# Patient Record
Sex: MALE | Race: OTHER | URBAN - METROPOLITAN AREA
[De-identification: names, ages, dates, MRNs, and addresses within clinical notes are randomized per-mention and may not be internally consistent; named-entity substitution may affect disease eponyms.]

---

## 2019-02-27 ENCOUNTER — OFFICE VISIT (OUTPATIENT)
Dept: FAMILY MEDICINE CLINIC | Age: 64
End: 2019-02-27

## 2019-02-27 VITALS
BODY MASS INDEX: 35.65 KG/M2 | RESPIRATION RATE: 20 BRPM | SYSTOLIC BLOOD PRESSURE: 115 MMHG | TEMPERATURE: 98.2 F | WEIGHT: 214 LBS | HEIGHT: 65 IN | OXYGEN SATURATION: 95 % | HEART RATE: 62 BPM | DIASTOLIC BLOOD PRESSURE: 80 MMHG

## 2019-02-27 DIAGNOSIS — I25.5 ISCHEMIC CARDIOMYOPATHY: ICD-10-CM

## 2019-02-27 DIAGNOSIS — Z12.11 SCREEN FOR COLON CANCER: ICD-10-CM

## 2019-02-27 DIAGNOSIS — E78.00 PURE HYPERCHOLESTEROLEMIA: ICD-10-CM

## 2019-02-27 DIAGNOSIS — I25.10 CORONARY ARTERY DISEASE INVOLVING NATIVE CORONARY ARTERY OF NATIVE HEART WITHOUT ANGINA PECTORIS: Primary | ICD-10-CM

## 2019-02-27 DIAGNOSIS — I10 ESSENTIAL HYPERTENSION: ICD-10-CM

## 2019-02-27 RX ORDER — FUROSEMIDE 40 MG/1
40 TABLET ORAL DAILY
Qty: 90 TAB | Refills: 1 | Status: SHIPPED | OUTPATIENT
Start: 2019-02-27 | End: 2020-01-02 | Stop reason: SDUPTHER

## 2019-02-27 RX ORDER — ATORVASTATIN CALCIUM 80 MG/1
80 TABLET, FILM COATED ORAL DAILY
Qty: 90 TAB | Refills: 1 | Status: SHIPPED | OUTPATIENT
Start: 2019-02-27 | End: 2020-01-02 | Stop reason: SDUPTHER

## 2019-02-27 RX ORDER — SPIRONOLACTONE 25 MG/1
25 TABLET ORAL DAILY
Qty: 90 TAB | Refills: 1 | Status: SHIPPED | OUTPATIENT
Start: 2019-02-27 | End: 2020-01-02 | Stop reason: SDUPTHER

## 2019-02-27 RX ORDER — CARVEDILOL 6.25 MG/1
6.25 TABLET ORAL 2 TIMES DAILY WITH MEALS
Qty: 180 TAB | Refills: 1 | Status: SHIPPED | OUTPATIENT
Start: 2019-02-27 | End: 2020-01-02 | Stop reason: SDUPTHER

## 2019-02-27 RX ORDER — SPIRONOLACTONE 25 MG/1
TABLET ORAL DAILY
COMMUNITY
End: 2019-02-27 | Stop reason: SDUPTHER

## 2019-02-27 RX ORDER — LISINOPRIL 10 MG/1
TABLET ORAL DAILY
COMMUNITY
End: 2019-02-27 | Stop reason: SDUPTHER

## 2019-02-27 RX ORDER — CARVEDILOL 6.25 MG/1
TABLET ORAL 2 TIMES DAILY WITH MEALS
COMMUNITY
End: 2019-02-27 | Stop reason: SDUPTHER

## 2019-02-27 RX ORDER — ATORVASTATIN CALCIUM 80 MG/1
80 TABLET, FILM COATED ORAL DAILY
COMMUNITY
End: 2019-02-27 | Stop reason: SDUPTHER

## 2019-02-27 RX ORDER — FUROSEMIDE 40 MG/1
TABLET ORAL DAILY
COMMUNITY
End: 2019-02-27 | Stop reason: SDUPTHER

## 2019-02-27 RX ORDER — LISINOPRIL 10 MG/1
10 TABLET ORAL DAILY
Qty: 90 TAB | Refills: 1 | Status: SHIPPED | OUTPATIENT
Start: 2019-02-27 | End: 2020-01-02 | Stop reason: SDUPTHER

## 2019-02-27 RX ORDER — ASPIRIN 81 MG/1
TABLET ORAL DAILY
COMMUNITY
End: 2020-01-02 | Stop reason: SDUPTHER

## 2019-02-27 NOTE — PROGRESS NOTES
Assessment/Plan:   
Diagnoses and all orders for this visit: 1. Coronary artery disease involving native coronary artery of native heart without angina pectoris- on statin, ASA, ACEI, BB. Followed by dr. Miguelito Summers. -     atorvastatin (LIPITOR) 80 mg tablet; Take 1 Tab by mouth daily. 2. Ischemic cardiomyopathy- EF stable at 35%. Being eval for AICD 
-     furosemide (LASIX) 40 mg tablet; Take 1 Tab by mouth daily. 3. Essential hypertension- controlled. Cont current. -     lisinopril (PRINIVIL, ZESTRIL) 10 mg tablet; Take 1 Tab by mouth daily. -     carvedilol (COREG) 6.25 mg tablet; Take 1 Tab by mouth two (2) times daily (with meals). -     spironolactone (ALDACTONE) 25 mg tablet; Take 1 Tab by mouth daily. 4. Pure hypercholesterolemia 
-     atorvastatin (LIPITOR) 80 mg tablet; Take 1 Tab by mouth daily. The plan was discussed with the patient. The patient verbalized understanding and is in agreement with the plan. All medication potential side effects were discussed with the patient. Health Maintenance: colo 8 yrs ago, [facility]. - get records. Also get vaccine records. Health Maintenance Topic Date Due  
 Hepatitis C Screening  1955  DTaP/Tdap/Td series (1 - Tdap) 10/08/1975  Shingrix Vaccine Age 50> (1 of 2) 10/08/2004  FOBT Q 1 YEAR AGE 50-75  10/08/2004  Influenza Age 5 to Adult  08/01/2018 Josué Silver is a 58 y.o.  male and presents with Establish Care Subjective: 
Pt is here to establish care. HTn -bp good. States he recently had labs and will  med records from [facility]. 
 
CAD - s/p stents 2015. On ASA 81mg, statin, ACEI, BB. Has known ischemic systolic CMO (ZS80%). He's being eval for AICD. Followed by Cards, Dr. Spring Burns. ROS: 
Constitutional: No recent weight change. No weakness/fatigue. No f/c. Skin: No rashes, change in nails/hair, itching HENT: No HA, dizziness. No hearing loss/tinnitus.   No nasal congestion/discharge. Eyes: No change in vision, double/blurred vision or eye pain/redness. Cardiovascular: No CP/palpitations. No TERAN/orthopnea/PND. Respiratory: No cough/sputum, dyspnea, wheezing. Gastointestinal: No dysphagia, reflux. No n/v. No constipation/diarrhea. No melena/rectal bleeding. Genitourinary: No dysuria, urinary hesitancy, nocturia, hematuria. No incontinence. Musculoskeletal: No joint pain/stiffness. No muscle pain/tenderness. Endo: No heat/cold intolerance, no polyuria/polydypsia. Heme: No h/o anemia. No easy bleeding/bruising. Allergy/Immunology: No seasonal rhinitis. Denies frequent colds, sinus/ear infections. Neurological: No seizures/numbness/weakness. No paresthesias. Psychiatric:  No depression, anxiety. PMH: 
Past Medical History:  
Diagnosis Date  Atherosclerotic heart disease of native coronary artery with angina pectoris ([address]  Cataract  Hypercholesterolemia  Hypertension  Ischemic cardiomyopathy PSH: 
Past Surgical History:  
Procedure Laterality Date  HX CORNEAL TRANSPLANT Right 2016  HX CORNEAL TRANSPLANT Left 2018  HX CORONARY STENT PLACEMENT  03/2015  HX HIP REPLACEMENT Bilateral 2011 SH: Social History Tobacco Use  Smoking status: Former Smoker  Smokeless tobacco: Never Used Substance Use Topics  Alcohol use: Yes Frequency: 2-3 times a week Drinks per session: 1 or 2 Binge frequency: Never  Drug use: No  
 
 
FH: 
Family History Problem Relation Age of Onset  Heart Disease Father Medications/Allergies: 
 
Current Outpatient Medications:  
  lisinopril (PRINIVIL, ZESTRIL) 10 mg tablet, Take  by mouth daily. , Disp: , Rfl:  
  carvedilol (COREG) 6.25 mg tablet, Take  by mouth two (2) times daily (with meals). , Disp: , Rfl:  
  spironolactone (ALDACTONE) 25 mg tablet, Take  by mouth daily. , Disp: , Rfl:  
   atorvastatin (LIPITOR) 80 mg tablet, Take 80 mg by mouth daily. , Disp: , Rfl:  
  furosemide (LASIX) 40 mg tablet, Take  by mouth daily. , Disp: , Rfl:  
  aspirin delayed-release 81 mg tablet, Take  by mouth daily. , Disp: , Rfl:  
No Known Allergies Objective: 
Visit Vitals /80 (BP 1 Location: Right arm, BP Patient Position: Sitting) Pulse 62 Temp 98.2 °F (36.8 °C) (Oral) Resp 20 Ht 5' 5\" (1.651 m) Wt 214 lb (97.1 kg) SpO2 95% BMI 35.61 kg/m² Constitutional: Well developed, nourished, no distress, alert, obese habitus HENT: Exterior ears and tympanic membranes normal bilaterally. Supple neck. No thyromegaly or lymphadenopathy. Oropharynx clear and moist mucous membranes. Eyes: Conjunctiva normal. PERRL. Cardiovascular: S1, S2.  RRR. No murmurs/rubs. No thrills palpated. No carotid bruits. Intact distal pulses. No edema. Pulmonary/Chest Wall: No abnormalities on inspection. Clear to auscultation bilaterally. No wheezing/rhonchi. Normal effort. GI: Soft, nontender, nondistended. Normal active bowel sounds. No  masses on palpation. No hepatosplenomegaly. Musculoskeletal: Gait normal.  Joints without deformity/tenderness. Neurological: Appropriate. No focal motor or sensory deficits. Speech normal.  
Skin: No lesions/rashes on inspection. Psych: Appropriate affect, judgement and insight. Short-term memory intact.

## 2019-02-27 NOTE — PROGRESS NOTES
Yuyd Sahu is a 58 y.o. male (: 1955) presenting to address: Chief Complaint Patient presents with Novs[place]umar.Precise Establish Care Vitals:  
 19 1028 BP: 115/80 Pulse: 62 Resp: 20 Temp: 98.2 °F (36.8 °C) TempSrc: Oral  
SpO2: 95% Weight: 214 lb (97.1 kg) Height: 5' 5\" (1.651 m) PainSc:   0 - No pain Hearing/Vision:  
 
 Visual Acuity Screening Right eye Left eye Both eyes Without correction: 20/40 20/00 20/30 With correction:     
 
 
Learning Assessment:  
 
Learning Assessment 2019 PRIMARY LEARNER Patient HIGHEST LEVEL OF EDUCATION - PRIMARY LEARNER  2 YEARS OF COLLEGE  
BARRIERS PRIMARY LEARNER NONE  
CO-LEARNER CAREGIVER No  
PRIMARY LANGUAGE ENGLISH  NEED No  
LEARNER PREFERENCE PRIMARY READING  
LEARNING SPECIAL TOPICS none ANSWERED BY patient RELATIONSHIP SELF  
ASSESSMENT COMMENT n/a Depression Screening:  
 
3 most recent PHQ Screens 2019 Little interest or pleasure in doing things Not at all Feeling down, depressed, irritable, or hopeless Not at all Total Score PHQ 2 0 Fall Risk Assessment:  
 
Fall Risk Assessment, last 12 mths 2019 Able to walk? Yes Fall in past 12 months? No  
 
Abuse Screening:  
 
Abuse Screening Questionnaire 2019 Do you ever feel afraid of your partner? Jory Lower Are you in a relationship with someone who physically or mentally threatens you? Jory Lower Is it safe for you to go home? Belen Navarro Advanced Directive: 1. Do you have an Advanced Directive? NO 
 
2. Would you like information on Advanced Directives?  YES

## 2019-07-01 ENCOUNTER — HOSPITAL ENCOUNTER (OUTPATIENT)
Dept: LAB | Age: 64
Discharge: HOME | End: 2019-07-01
Payer: MEDICARE

## 2019-07-01 ENCOUNTER — OFFICE VISIT (OUTPATIENT)
Dept: FAMILY MEDICINE CLINIC | Age: 64
End: 2019-07-01

## 2019-07-01 VITALS
HEIGHT: 65 IN | HEART RATE: 62 BPM | BODY MASS INDEX: 36.06 KG/M2 | SYSTOLIC BLOOD PRESSURE: 100 MMHG | OXYGEN SATURATION: 97 % | DIASTOLIC BLOOD PRESSURE: 72 MMHG | WEIGHT: 216.4 LBS | TEMPERATURE: 98.2 F | RESPIRATION RATE: 18 BRPM

## 2019-07-01 DIAGNOSIS — G47.62 NOCTURNAL LEG CRAMPS: Primary | ICD-10-CM

## 2019-07-01 DIAGNOSIS — Z11.59 SPECIAL SCREENING EXAMINATION FOR VIRAL DISEASE: ICD-10-CM

## 2019-07-01 DIAGNOSIS — G47.62 NOCTURNAL LEG CRAMPS: ICD-10-CM

## 2019-07-01 DIAGNOSIS — I25.10 CORONARY ARTERY DISEASE INVOLVING NATIVE CORONARY ARTERY OF NATIVE HEART WITHOUT ANGINA PECTORIS: ICD-10-CM

## 2019-07-01 LAB
ANION GAP SERPL CALC-SCNC: 9 MMOL/L (ref 3–18)
BUN SERPL-MCNC: 20 MG/DL (ref 7–18)
BUN/CREAT SERPL: 15 (ref 12–20)
CALCIUM SERPL-MCNC: 8.6 MG/DL (ref 8.5–10.1)
CHLORIDE SERPL-SCNC: 102 MMOL/L (ref 100–108)
CHOLEST SERPL-MCNC: 134 MG/DL
CO2 SERPL-SCNC: 27 MMOL/L (ref 21–32)
CREAT SERPL-MCNC: 1.36 MG/DL (ref 0.6–1.3)
ERYTHROCYTE [DISTWIDTH] IN BLOOD BY AUTOMATED COUNT: 13.2 % (ref 11.6–14.5)
GLUCOSE SERPL-MCNC: 92 MG/DL (ref 74–99)
HCT VFR BLD AUTO: 44.1 % (ref 36–48)
HDLC SERPL-MCNC: 44 MG/DL (ref 40–60)
HDLC SERPL: 3 {RATIO} (ref 0–5)
HGB BLD-MCNC: 15 G/DL (ref 13–16)
LDLC SERPL CALC-MCNC: 59.4 MG/DL (ref 0–100)
LIPID PROFILE,FLP: ABNORMAL
MAGNESIUM SERPL-MCNC: 2.6 MG/DL (ref 1.6–2.6)
MCH RBC QN AUTO: 31.6 PG (ref 24–34)
MCHC RBC AUTO-ENTMCNC: 34 G/DL (ref 31–37)
MCV RBC AUTO: 92.8 FL (ref 74–97)
PLATELET # BLD AUTO: 288 K/UL (ref 135–420)
PMV BLD AUTO: 9.5 FL (ref 9.2–11.8)
POTASSIUM SERPL-SCNC: 4.2 MMOL/L (ref 3.5–5.5)
RBC # BLD AUTO: 4.75 M/UL (ref 4.7–5.5)
SODIUM SERPL-SCNC: 138 MMOL/L (ref 136–145)
TRIGL SERPL-MCNC: 153 MG/DL (ref ?–150)
VLDLC SERPL CALC-MCNC: 30.6 MG/DL
WBC # BLD AUTO: 6.5 K/UL (ref 4.6–13.2)

## 2019-07-01 PROCEDURE — 83735 ASSAY OF MAGNESIUM: CPT

## 2019-07-01 PROCEDURE — 80061 LIPID PANEL: CPT

## 2019-07-01 PROCEDURE — 36415 COLL VENOUS BLD VENIPUNCTURE: CPT

## 2019-07-01 PROCEDURE — 86803 HEPATITIS C AB TEST: CPT

## 2019-07-01 PROCEDURE — 80048 BASIC METABOLIC PNL TOTAL CA: CPT

## 2019-07-01 PROCEDURE — 85027 COMPLETE CBC AUTOMATED: CPT

## 2019-07-01 NOTE — PROGRESS NOTES
Assessment/Plan:    1. Nocturnal leg cramps  -ck potassium/mag and bun. Bun has been elevated in past.  Will advise based on labs. - METABOLIC PANEL, BASIC; Future  - MAGNESIUM; Future    2. Special screening examination for viral disease  - HCV AB W/RFLX TO SKYLER; Future    3. Coronary artery disease involving native coronary artery of native heart without angina pectoris  -on statin. Ck labs. - LIPID PANEL; Future  - CBC W/O DIFF; Future      The plan was discussed with the patient. The patient verbalized understanding and is in agreement with the plan. All medication potential side effects were discussed with the patient. Health Maintenance:   Health Maintenance   Topic Date Due    Hepatitis C Screening  1955    Shingrix Vaccine Age 50> (1 of 2) 10/08/2004    MEDICARE YEARLY EXAM  02/27/2019    Influenza Age 5 to Adult  08/01/2019    DTaP/Tdap/Td series (2 - Td) 01/24/2022    COLONOSCOPY  05/09/2029    Pneumococcal 0-64 years  Aged Out       Patience Cristopher is a 58 y.o. male and presents with Leg Pain (At night)     Subjective:  Pt with h/o CAD c/o nocturnal leg cramps in BLE. He's on spironolactone and lasix. She states he drinks almost a gallon of water/day. ROS:  Constitutional: No recent weight change. No weakness/fatigue. No f/c. Eyes: No change in vision, double/blurred vision or eye pain/redness. Cardiovascular: No CP/palpitations. No TERAN/orthopnea/PND. Respiratory: No cough/sputum, dyspnea, wheezing. Musculoskeletal: No joint pain/stiffness. No muscle pain/tenderness. The problem list was updated as a part of today's visit. Patient Active Problem List   Diagnosis Code    Coronary artery disease involving native coronary artery of native heart without angina pectoris I25.10    Ischemic cardiomyopathy I25.5       The PSH, FH were reviewed.     SH:  Social History     Tobacco Use    Smoking status: Former Smoker    Smokeless tobacco: Never Used   Substance Use Topics    Alcohol use: Yes     Frequency: 2-3 times a week     Drinks per session: 1 or 2     Binge frequency: Never    Drug use: No       Medications/Allergies:  Current Outpatient Medications on File Prior to Visit   Medication Sig Dispense Refill    aspirin delayed-release 81 mg tablet Take  by mouth daily.  lisinopril (PRINIVIL, ZESTRIL) 10 mg tablet Take 1 Tab by mouth daily. 90 Tab 1    carvedilol (COREG) 6.25 mg tablet Take 1 Tab by mouth two (2) times daily (with meals). 180 Tab 1    spironolactone (ALDACTONE) 25 mg tablet Take 1 Tab by mouth daily. 90 Tab 1    atorvastatin (LIPITOR) 80 mg tablet Take 1 Tab by mouth daily. 90 Tab 1    furosemide (LASIX) 40 mg tablet Take 1 Tab by mouth daily. 90 Tab 1     No current facility-administered medications on file prior to visit. No Known Allergies    Objective:  Visit Vitals  /72 (BP 1 Location: Right arm, BP Patient Position: Sitting)   Pulse 62   Temp 98.2 °F (36.8 °C) (Oral)   Resp 18   Ht 5' 5\" (1.651 m)   Wt 216 lb 6.4 oz (98.2 kg)   SpO2 97%   BMI 36.01 kg/m²      Constitutional: Well developed, nourished, no distress, alert, obese habitus   CV: S1, S2.  RRR. No murmurs/rubs. No edema. Pulm: No abnormalities on inspection. Clear to auscultation bilaterally. No wheezing/rhonchi. Normal effort. MS: Gait normal.  Joints without deformity/tenderness. No calf tenderness.

## 2019-07-01 NOTE — PROGRESS NOTES
Rosalinda Barthel is a 58 y.o. male (: 1955) presenting to address:    Chief Complaint   Patient presents with    Leg Pain     At night       Vitals:    19 1133   BP: 100/72   Pulse: 62   Resp: 18   Temp: 98.2 °F (36.8 °C)   TempSrc: Oral   SpO2: 97%   Weight: 216 lb 6.4 oz (98.2 kg)   Height: 5' 5\" (1.651 m)   PainSc:   6   PainLoc: Leg       Learning Assessment:     Learning Assessment 2019   PRIMARY LEARNER Patient   HIGHEST LEVEL OF EDUCATION - PRIMARY LEARNER  2 YEARS OF COLLEGE   BARRIERS PRIMARY LEARNER NONE   CO-LEARNER CAREGIVER No   PRIMARY LANGUAGE ENGLISH    NEED No   LEARNER PREFERENCE PRIMARY READING   LEARNING SPECIAL TOPICS none   ANSWERED BY patient   RELATIONSHIP SELF   ASSESSMENT COMMENT n/a     Depression Screening:     3 most recent PHQ Screens 2019   Little interest or pleasure in doing things Not at all   Feeling down, depressed, irritable, or hopeless Not at all   Total Score PHQ 2 0     Fall Risk Assessment:     Fall Risk Assessment, last 12 mths 2019   Able to walk? Yes   Fall in past 12 months? No     Abuse Screening:     Abuse Screening Questionnaire 2019   Do you ever feel afraid of your partner? N   Are you in a relationship with someone who physically or mentally threatens you? N   Is it safe for you to go home? Y     Coordination of Care Questionaire:   1. Have you been to the ER, urgent care clinic since your last visit? Hospitalized since your last visit? NO    2. Have you seen or consulted any other health care providers outside of the [address] [place] [address] since your last visit? Include any pap smears or colon screening. YES- gastroenterology    Advanced Directive:   1. Do you have an Advanced Directive? NO    2. Would you like information on Advanced Directives?  YES

## 2019-07-02 LAB
HCV AB S/CO SERPL IA: <0.1 S/CO RATIO (ref 0–0.9)
HCV AB SERPL QL IA: NORMAL

## 2019-08-20 ENCOUNTER — OFFICE VISIT (OUTPATIENT)
Dept: FAMILY MEDICINE CLINIC | Age: 64
End: 2019-08-20

## 2019-08-20 VITALS
HEART RATE: 64 BPM | WEIGHT: 224 LBS | BODY MASS INDEX: 37.32 KG/M2 | SYSTOLIC BLOOD PRESSURE: 113 MMHG | TEMPERATURE: 98.2 F | HEIGHT: 65 IN | DIASTOLIC BLOOD PRESSURE: 79 MMHG | RESPIRATION RATE: 18 BRPM | OXYGEN SATURATION: 100 %

## 2019-08-20 DIAGNOSIS — E66.01 CLASS 2 SEVERE OBESITY DUE TO EXCESS CALORIES WITH SERIOUS COMORBIDITY AND BODY MASS INDEX (BMI) OF 37.0 TO 37.9 IN ADULT (HCC): ICD-10-CM

## 2019-08-20 DIAGNOSIS — M54.9 MID BACK PAIN: Primary | ICD-10-CM

## 2019-08-20 DIAGNOSIS — Z23 ENCOUNTER FOR IMMUNIZATION: ICD-10-CM

## 2019-08-20 PROBLEM — N18.30 STAGE 3 CHRONIC KIDNEY DISEASE (HCC): Status: ACTIVE | Noted: 2019-08-20

## 2019-08-20 RX ORDER — NAPROXEN 500 MG/1
500 TABLET ORAL 2 TIMES DAILY WITH MEALS
Qty: 20 TAB | Refills: 0 | Status: SHIPPED | OUTPATIENT
Start: 2019-08-20 | End: 2019-08-30

## 2019-08-20 RX ORDER — CYCLOBENZAPRINE HCL 10 MG
10 TABLET ORAL
Qty: 30 TAB | Refills: 0 | Status: SHIPPED | OUTPATIENT
Start: 2019-08-20 | End: 2020-11-25

## 2019-08-20 RX ORDER — DIFLUPREDNATE 0.5 MG/ML
1 EMULSION OPHTHALMIC DAILY
COMMUNITY
Start: 2016-11-29

## 2019-08-20 RX ORDER — BRIMONIDINE TARTRATE, TIMOLOL MALEATE 2; 5 MG/ML; MG/ML
1 SOLUTION/ DROPS OPHTHALMIC DAILY
COMMUNITY
Start: 2016-11-29

## 2019-08-20 NOTE — PROGRESS NOTES
Assessment/Plan:    1. Mid back pain  -home exercises. Nsaids, muscle relaxants prn.  - cyclobenzaprine (FLEXERIL) 10 mg tablet; Take 1 Tab by mouth nightly as needed for Muscle Spasm(s). Dispense: 30 Tab; Refill: 0  - naproxen (NAPROSYN) 500 mg tablet; Take 1 Tab by mouth two (2) times daily (with meals) for 10 days. Dispense: 20 Tab; Refill: 0    2. Class 2 severe obesity due to excess calories with serious comorbidity and body mass index (BMI) of 37.0 to 37.9 in adult [facility])  -work on diet/wt loss. 3. Encounter for immunization  - INFLUENZA VIRUS VAC QUAD,SPLIT,PRESV FREE SYRINGE IM  - ADMIN INFLUENZA VIRUS VAC      The plan was discussed with the patient. The patient verbalized understanding and is in agreement with the plan. All medication potential side effects were discussed with the patient. Health Maintenance:   Health Maintenance   Topic Date Due    MEDICARE YEARLY EXAM  02/27/2019    Influenza Age 5 to Adult  08/01/2019    Shingrix Vaccine Age 50> (1 of 2) 07/15/2020 (Originally 10/8/2004)    DTaP/Tdap/Td series (2 - Td) 01/24/2022    COLONOSCOPY  05/09/2029    Hepatitis C Screening  Completed    Pneumococcal 0-64 years  Aged Out       Valentino Hark is a 58 y.o. male and presents with Back Pain     Subjective:  Pt c/o back pain. States when he gets out of bed, he has pain in lateral bilat midback. States it \"feels deep\". Pain is intermittent throughout the day. But sx are worst in am.   Twisting makes pain worse as well. Pain doesn't radiate. Having pain x 3 weeks. States it may have started after doing situp machine at the gym. He wears a back brace during daytime to help with pain. Icy hot was ineffective. He also tried massage w/o relief. Obesity - he wants to lose wt and wants to know if a nutritionist is covered under insurance. Doesn't count calories    ROS:  Constitutional: No recent weight change. No weakness/fatigue. No f/c.    Cardiovascular: No CP/palpitations. No TERAN/orthopnea/PND. Respiratory: No cough/sputum, dyspnea, wheezing. Gastointestinal: No dysphagia, reflux. No n/v. No constipation/diarrhea. No melena/rectal bleeding. Musculoskeletal: No joint pain/stiffness. No muscle pain/tenderness. Neurological: No seizures/numbness/weakness. No paresthesias. The problem list was updated as a part of today's visit. Patient Active Problem List   Diagnosis Code    Coronary artery disease involving native coronary artery of native heart without angina pectoris I25.10    Ischemic cardiomyopathy I25.5       The PSH, FH were reviewed. SH:  Social History     Tobacco Use    Smoking status: Former Smoker    Smokeless tobacco: Never Used   Substance Use Topics    Alcohol use: Yes     Frequency: 2-3 times a week     Drinks per session: 1 or 2     Binge frequency: Never    Drug use: No       Medications/Allergies:  Current Outpatient Medications on File Prior to Visit   Medication Sig Dispense Refill    aspirin delayed-release 81 mg tablet Take  by mouth daily.  lisinopril (PRINIVIL, ZESTRIL) 10 mg tablet Take 1 Tab by mouth daily. 90 Tab 1    carvedilol (COREG) 6.25 mg tablet Take 1 Tab by mouth two (2) times daily (with meals). 180 Tab 1    spironolactone (ALDACTONE) 25 mg tablet Take 1 Tab by mouth daily. 90 Tab 1    atorvastatin (LIPITOR) 80 mg tablet Take 1 Tab by mouth daily. 90 Tab 1    furosemide (LASIX) 40 mg tablet Take 1 Tab by mouth daily. 90 Tab 1     No current facility-administered medications on file prior to visit. No Known Allergies    Objective:  Visit Vitals  /79 (BP 1 Location: Left arm, BP Patient Position: Sitting)   Pulse 64   Temp 98.2 °F (36.8 °C) (Oral)   Resp 18   Ht 5' 5\" (1.651 m)   Wt 224 lb (101.6 kg)   SpO2 100%   BMI 37.28 kg/m²      Constitutional: Well developed, nourished, no distress, alert, obese habitus   CV: S1, S2.  RRR. No murmurs/rubs. No edema.     Pulm: No abnormalities on inspection. Clear to auscultation bilaterally. No wheezing/rhonchi. Normal effort. MS: Gait normal.  Joints without deformity/tenderness. Strength intact bilateral upper and lower ext. Normal ROM all extremities. No paraspinal or vertebral body tenderness. No rib tenderness. Neuro: A/O x 3. No focal motor or sensory deficits.  Speech normal.

## 2019-08-20 NOTE — PROGRESS NOTES
Purnima Patrick is a 58 y.o. male (: 1955) presenting to address:    Chief Complaint   Patient presents with    Back Pain       Vitals:    19 0858   BP: 113/79   Pulse: 64   Resp: 18   Temp: 98.2 °F (36.8 °C)   TempSrc: Oral   SpO2: 100%   Weight: 224 lb (101.6 kg)   Height: 5' 5\" (1.651 m)   PainSc:   7   PainLoc: Back       Learning Assessment:     Learning Assessment 2019   PRIMARY LEARNER Patient   HIGHEST LEVEL OF EDUCATION - PRIMARY LEARNER  2 YEARS OF COLLEGE   BARRIERS PRIMARY LEARNER NONE   CO-LEARNER CAREGIVER No   PRIMARY LANGUAGE ENGLISH    NEED No   LEARNER PREFERENCE PRIMARY READING   LEARNING SPECIAL TOPICS none   ANSWERED BY patient   RELATIONSHIP SELF   ASSESSMENT COMMENT n/a     Depression Screening:     3 most recent PHQ Screens 2019   Little interest or pleasure in doing things Not at all   Feeling down, depressed, irritable, or hopeless Not at all   Total Score PHQ 2 0     Fall Risk Assessment:     Fall Risk Assessment, last 12 mths 2019   Able to walk? Yes   Fall in past 12 months? No     Abuse Screening:     Abuse Screening Questionnaire 2019   Do you ever feel afraid of your partner? N   Are you in a relationship with someone who physically or mentally threatens you? N   Is it safe for you to go home? Y     Coordination of Care Questionaire:   1. Have you been to the ER, urgent care clinic since your last visit? Hospitalized since your last visit? NO    2. Have you seen or consulted any other health care providers outside of the [address] [place] [address] since your last visit? Include any pap smears or colon screening. NO    Advanced Directive:   1. Do you have an Advanced Directive? YES    2. Would you like information on Advanced Directives?  NO

## 2019-08-20 NOTE — PATIENT INSTRUCTIONS
Healthy Upper Back: Exercises  Introduction  Here are some examples of exercises for your upper back. Start each exercise slowly. Ease off the exercise if you start to have pain. Your doctor or physical therapist will tell you when you can start these exercises and which ones will work best for you. How to do the exercises  Lower neck and upper back stretch    1. Stretch your arms out in front of your body. Clasp one hand on top of your other hand. 2. Gently reach out so that you feel your shoulder blades stretching away from each other. 3. Gently bend your head forward. 4. Hold for 15 to 30 seconds. 5. Repeat 2 to 4 times. Midback stretch    1. Kneel on the floor, and sit back on your ankles. 2. Lean forward, place your hands on the floor, and stretch your arms out in front of you. Rest your head between your arms. 3. Gently push your chest toward the floor, reaching as far in front of you as possible. 4. Hold for 15 to 30 seconds. 5. Repeat 2 to 4 times. Shoulder rolls    1. Sit comfortably with your feet shoulder-width apart. You can also do this exercise while standing. 2. Roll your shoulders up, then back, and then down in a smooth, circular motion. 3. Repeat 2 to 4 times. Wall push-up    1. Stand against a wall with your feet about 12 to 24 inches back from the wall. If you feel any pain when you do this exercise, stand closer to the wall. 2. Place your hands on the wall slightly wider apart than your shoulders, and lean forward. 3. Gently lean your body toward the wall. Then push back to your starting position. Keep the motion smooth and controlled. 4. Repeat 8 to 12 times. Resisted shoulder blade squeeze    1. Sit or stand, holding the band in both hands in front of you. Keep your elbows close to your sides, bent at a 90-degree angle. Your palms should face up. 2. Squeeze your shoulder blades together, and move your arms to the outside, stretching the band.  Be sure to keep your elbows at your sides while you do this. 3. Relax. 4. Repeat 8 to 12 times. Resisted rows    1. Put the band around a solid object, such as a bedpost, at about waist level. Hold one end of the band in each hand. 2. With your elbows at your sides and bent to 90 degrees, pull the band back to move your shoulder blades toward each other. Return to the starting position. 3. Repeat 8 to 12 times. Follow-up care is a key part of your treatment and safety. Be sure to make and go to all appointments, and call your doctor if you are having problems. It's also a good idea to know your test results and keep a list of the medicines you take. Where can you learn more? Go to http://brittnee-janet.info/. Enter G421 in the search box to learn more about \"Healthy Upper Back: Exercises. \"  Current as of: September 20, 2018  Content Version: 12.1  © 4767-4197 [employer]. Care instructions adapted under license by [employer] (which disclaims liability or warranty for this information). If you have questions about a medical condition or this instruction, always ask your healthcare professional. [place] [address] any warranty or liability for your use of this information. Vaccine Information Statement    Influenza (Flu) Vaccine (Inactivated or Recombinant): What You Need to Know    Many Vaccine Information Statements are available in Filipino and other languages. See www.immunize.org/vis  Hojas de información sobre vacunas están disponibles en español y en muchos otros idiomas. Visite www.immunize.org/vis    1. Why get vaccinated? Influenza vaccine can prevent influenza (flu). Flu is a contagious disease that spreads around the [place] every year, usually between October and May. Anyone can get the flu, but it is more dangerous for some people.  Infants and young children, people 72years of age and older, pregnant women, and people with certain health conditions or a weakened immune system are at greatest risk of flu complications. Pneumonia, bronchitis, sinus infections and ear infections are examples of flu-related complications. If you have a medical condition, such as heart disease, cancer or diabetes, flu can make it worse. Flu can cause fever and chills, sore throat, muscle aches, fatigue, cough, headache, and runny or stuffy nose. Some people may have vomiting and diarrhea, though this is more common in children than adults. Each year thousands of people in the [facility] die from flu, and many more are hospitalized. Flu vaccine prevents millions of illnesses and flu-related visits to the doctor each year. 2. Influenza vaccines     CDC recommends everyone 10months of age and older get vaccinated every flu season. Children 6 months through 6years of age may need 2 doses during a single flu season. Everyone else needs only 1 dose each flu season. It takes about 2 weeks for protection to develop after vaccination. There are many flu viruses, and they are always changing. Each year a new flu vaccine is made to protect against three or four viruses that are likely to cause disease in the upcoming flu season. Even when the vaccine doesnt exactly match these viruses, it may still provide some protection. Influenza vaccine does not cause flu. Influenza vaccine may be given at the same time as other vaccines. 3. Talk with your health care provider    Tell your vaccine provider if the person getting the vaccine:   Has had an allergic reaction after a previous dose of influenza vaccine, or has any severe, life-threatening allergies.  Has ever had Guillain-Barré Syndrome (also called GBS). In some cases, your health care provider may decide to postpone influenza vaccination to a future visit. People with minor illnesses, such as a cold, may be vaccinated.  People who are moderately or severely ill should usually wait until they recover before getting influenza vaccine. Your health care provider can give you more information. 4. Risks of a reaction     Soreness, redness, and swelling where shot is given, fever, muscle aches, and headache can happen after influenza vaccine.  There may be a very small increased risk of Guillain-Barré Syndrome (GBS) after inactivated influenza vaccine (the flu shot). Sana Caballero children who get the flu shot along with pneumococcal vaccine (PCV13), and/or DTaP vaccine at the same time might be slightly more likely to have a seizure caused by fever. Tell your health care provider if a child who is getting flu vaccine has ever had a seizure. People sometimes faint after medical procedures, including vaccination. Tell your provider if you feel dizzy or have vision changes or ringing in the ears. As with any medicine, there is a very remote chance of a vaccine causing a severe allergic reaction, other serious injury, or death. 5. What if there is a serious problem? An allergic reaction could occur after the vaccinated person leaves the clinic. If you see signs of a severe allergic reaction (hives, swelling of the face and throat, difficulty breathing, a fast heartbeat, dizziness, or weakness), call 9-1-1 and get the person to the nearest hospital.    For other signs that concern you, call your health care provider. Adverse reactions should be reported to the Vaccine Adverse Event Reporting System (VAERS). Your health care provider will usually file this report, or you can do it yourself. Visit the VAERS website at www.vaers. hhs.gov or call 2-153.366.2640. VAERS is only for reporting reactions, and VAERS staff do not give medical advice. 6. The National Vaccine Injury Compensation Program    The [facility] Vaccine Injury Compensation Program (VICP) is a federal program that was created to compensate people who may have been injured by certain vaccines.  Visit the VICP website at www.hrsa.gov/vaccinecompensation or call 7-360.316.4759 to learn about the program and about filing a claim. There is a time limit to file a claim for compensation. 7. How can I learn more?  Ask your health care provider.  Call your local or state health department.  Contact the Centers for Disease Control and Prevention (CDC):  - Call 2-436.578.3726 (4-932-CEP-INFO) or  - Visit CDCs influenza website at www.cdc.gov/flu    Vaccine Information Statement (Interim)  Inactivated Influenza Vaccine   8/15/2019  42 ANNEKar Donald [id number]   Department of Health and Human Services  Centers for Disease Control and Prevention    Office Use Only    Weight loss:     Watch portion sizes:   1/2 your plate should be veggies for lunch and dinner. Carbohydrates should be no larger than the size of a tennis ball. Protein/meat should be the size of a deck of playing cards.    Eat 3 meals/day  Exercise - 150 minutes/week   No beverages with calories  Aim for losing 1lb/week  Follow a 1600 calorie/day diet

## 2019-08-20 NOTE — PROGRESS NOTES
Immunization/s administered 8/20/2019 by Javad Asher LPN with guardian's consent. Patient tolerated procedure well. No reactions noted. Seasonal flu right deltoid.

## 2020-01-02 DIAGNOSIS — I10 ESSENTIAL HYPERTENSION: ICD-10-CM

## 2020-01-02 DIAGNOSIS — I25.5 ISCHEMIC CARDIOMYOPATHY: ICD-10-CM

## 2020-01-02 DIAGNOSIS — I25.10 CORONARY ARTERY DISEASE INVOLVING NATIVE CORONARY ARTERY OF NATIVE HEART WITHOUT ANGINA PECTORIS: ICD-10-CM

## 2020-01-02 DIAGNOSIS — E78.00 PURE HYPERCHOLESTEROLEMIA: ICD-10-CM

## 2020-01-02 RX ORDER — SPIRONOLACTONE 25 MG/1
25 TABLET ORAL DAILY
Qty: 90 TAB | Refills: 1 | Status: SHIPPED | OUTPATIENT
Start: 2020-01-02 | End: 2022-07-18 | Stop reason: SDUPTHER

## 2020-01-02 RX ORDER — LISINOPRIL 10 MG/1
10 TABLET ORAL DAILY
Qty: 90 TAB | Refills: 1 | Status: SHIPPED | OUTPATIENT
Start: 2020-01-02 | End: 2022-07-18 | Stop reason: SDUPTHER

## 2020-01-02 RX ORDER — CARVEDILOL 6.25 MG/1
6.25 TABLET ORAL 2 TIMES DAILY WITH MEALS
Qty: 180 TAB | Refills: 1 | Status: SHIPPED | OUTPATIENT
Start: 2020-01-02 | End: 2022-07-18 | Stop reason: SDUPTHER

## 2020-01-02 RX ORDER — ATORVASTATIN CALCIUM 80 MG/1
80 TABLET, FILM COATED ORAL DAILY
Qty: 90 TAB | Refills: 1 | Status: SHIPPED | OUTPATIENT
Start: 2020-01-02 | End: 2022-07-18 | Stop reason: SDUPTHER

## 2020-01-02 RX ORDER — FUROSEMIDE 40 MG/1
40 TABLET ORAL DAILY
Qty: 90 TAB | Refills: 1 | Status: SHIPPED | OUTPATIENT
Start: 2020-01-02 | End: 2022-07-18 | Stop reason: SDUPTHER

## 2020-01-02 RX ORDER — ASPIRIN 81 MG/1
81 TABLET ORAL DAILY
Qty: 90 TAB | Refills: 1 | Status: SHIPPED | OUTPATIENT
Start: 2020-01-02 | End: 2022-07-18 | Stop reason: SDUPTHER

## 2020-01-02 NOTE — TELEPHONE ENCOUNTER
Requested Prescriptions     Pending Prescriptions Disp Refills    lisinopril (PRINIVIL, ZESTRIL) 10 mg tablet 90 Tab 1     Sig: Take 1 Tab by mouth daily.  aspirin delayed-release 81 mg tablet       Sig: Take  by mouth daily.  atorvastatin (LIPITOR) 80 mg tablet 90 Tab 1     Sig: Take 1 Tab by mouth daily.  carvedilol (COREG) 6.25 mg tablet 180 Tab 1     Sig: Take 1 Tab by mouth two (2) times daily (with meals).  spironolactone (ALDACTONE) 25 mg tablet 90 Tab 1     Sig: Take 1 Tab by mouth daily.  furosemide (LASIX) 40 mg tablet 90 Tab 1     Sig: Take 1 Tab by mouth daily.

## 2020-08-13 ENCOUNTER — VIRTUAL VISIT (OUTPATIENT)
Dept: FAMILY MEDICINE CLINIC | Age: 65
End: 2020-08-13

## 2020-08-13 DIAGNOSIS — I25.10 CORONARY ARTERY DISEASE INVOLVING NATIVE CORONARY ARTERY OF NATIVE HEART WITHOUT ANGINA PECTORIS: ICD-10-CM

## 2020-08-13 DIAGNOSIS — N18.30 STAGE 3 CHRONIC KIDNEY DISEASE (HCC): ICD-10-CM

## 2020-08-13 DIAGNOSIS — Z00.00 INITIAL MEDICARE ANNUAL WELLNESS VISIT: Primary | ICD-10-CM

## 2020-08-13 NOTE — PROGRESS NOTES
.  This is the Subsequent Medicare Annual Wellness Exam, performed 12 months or more after the Initial AWV or the last Subsequent AWV    I have reviewed the patient's medical history in detail and updated the computerized patient record. History     Patient Active Problem List   Diagnosis Code    Coronary artery disease involving native coronary artery of native heart without angina pectoris I25.10    Ischemic cardiomyopathy I25.5    Stage 3 chronic kidney disease (HCC) N18.3    Class 2 severe obesity due to excess calories with serious comorbidity and body mass index (BMI) of 37.0 to 37.9 in adult ([facility] [address] E66.01, Z68.37     Past Medical History:   Diagnosis Date    Atherosclerotic heart disease of native coronary artery with angina pectoris ([facility] [address]     Cataract     Hypercholesterolemia     Hypertension     Ischemic cardiomyopathy       Past Surgical History:   Procedure Laterality Date    HX CORNEAL TRANSPLANT Right 2016    HX CORNEAL TRANSPLANT Left 2018    HX CORONARY STENT PLACEMENT  03/2015    HX HIP REPLACEMENT Bilateral 2011     Current Outpatient Medications   Medication Sig Dispense Refill    lisinopril (PRINIVIL, ZESTRIL) 10 mg tablet Take 1 Tab by mouth daily. 90 Tab 1    aspirin delayed-release 81 mg tablet Take 1 Tab by mouth daily. 90 Tab 1    atorvastatin (LIPITOR) 80 mg tablet Take 1 Tab by mouth daily. 90 Tab 1    carvedilol (COREG) 6.25 mg tablet Take 1 Tab by mouth two (2) times daily (with meals). 180 Tab 1    spironolactone (ALDACTONE) 25 mg tablet Take 1 Tab by mouth daily. 90 Tab 1    furosemide (LASIX) 40 mg tablet Take 1 Tab by mouth daily. 90 Tab 1    cyclobenzaprine (FLEXERIL) 10 mg tablet Take 1 Tab by mouth nightly as needed for Muscle Spasm(s). 30 Tab 0    brimonidine-timolol (COMBIGAN) 0.2-0.5 % drop ophthalmic solution Apply 1 Drop to eye daily.  difluprednate (DUREZOL) 0.05 % ophthalmic emulsion Apply 1 Drop to eye daily.        No Known Allergies    Family History   Problem Relation Age of Onset    Heart Disease Father      Social History     Tobacco Use    Smoking status: Former Smoker    Smokeless tobacco: Never Used   Substance Use Topics    Alcohol use: Yes     Frequency: 2-3 times a week     Drinks per session: 1 or 2     Binge frequency: Never       Depression Risk Factor Screening:     3 most recent PHQ Screens 8/13/2020   Little interest or pleasure in doing things Not at all   Feeling down, depressed, irritable, or hopeless Not at all   Total Score PHQ 2 0     Alcohol Risk Factor Screening (MALE > 65): Do you average more 1 drink per night or more than 7 drinks a week: Yes    In the past three months have you have had more than 4 drinks containing alcohol on one occasion: No      Functional Ability and Level of Safety:   Hearing: Hearing is good. Activities of Daily Living: The home contains: no safety equipment. Patient does total self care     Ambulation: with no difficulty     Fall Risk:  Fall Risk Assessment, last 12 mths 8/13/2020   Able to walk? Yes   Fall in past 12 months? No     Abuse Screen:  Patient is not abused       Cognitive Screening   Has your family/caregiver stated any concerns about your memory: no    Cognitive Screening: . Patient Care Team   Patient Care Team:  Surjit Guajardo MD as PCP - General (Internal Medicine)  Joi Knight MD (Gastroenterology)  Renea Vasquez MD (Cardiology)    Assessment/Plan   Education and counseling provided:  Are appropriate based on today's review and evaluation    Diagnoses and all orders for this visit:    1. Initial Medicare annual wellness visit    2. Coronary artery disease involving native coronary artery of native heart without angina pectoris  -     CBC W/O DIFF; Future  -     METABOLIC PANEL, COMPREHENSIVE; Future  -     LIPID PANEL; Future    3. Stage 3 chronic kidney disease (HCC)  -     METABOLIC PANEL, COMPREHENSIVE; Future  -     LIPID PANEL;  Future    pt to get flu and pneumovax at pharmacy    Health Maintenance Due   Topic Date Due    GLAUCOMA SCREENING Q2Y  10/08/2019    Pneumococcal 65+ years (1 of 1 - PPSV23) 10/08/2019    Lipid Screen  07/01/2020    Influenza Age 5 to Adult  08/01/2020       Saad Saunders, who was evaluated through a synchronous (real-time) audio-video encounter, and/or his healthcare decision maker, is aware that it is a billable service, with coverage as determined by his insurance carrier. He provided verbal consent to proceed: Yes, and patient identification was verified. It was conducted pursuant to the emergency declaration under the [facility] [place], [address] [place] [address] authority and the [employer] Act. A caregiver was present when appropriate. Ability to conduct physical exam was limited. I was at home. The patient was at home.     Isa Watters MD

## 2020-08-13 NOTE — PATIENT INSTRUCTIONS
Medicare Wellness Visit, Male The best way to live healthy is to have a lifestyle where you eat a well-balanced diet, exercise regularly, limit alcohol use, and quit all forms of tobacco/nicotine, if applicable. Regular preventive services are another way to keep healthy. Preventive services (vaccines, screening tests, monitoring & exams) can help personalize your care plan, which helps you manage your own care. Screening tests can find health problems at the earliest stages, when they are easiest to treat. [facility] follows the current, evidence-based guidelines published by the [facility] ([facility]) when recommending preventive services for our patients. Because we follow these guidelines, sometimes recommendations change over time as research supports it. (For example, a prostate screening blood test is no longer routinely recommended for men with no symptoms). Of course, you and your doctor may decide to screen more often for some diseases, based on your risk and co-morbidities (chronic disease you are already diagnosed with). Preventive services for you include: - Medicare offers their members a free annual wellness visit, which is time for you and your primary care provider to discuss and plan for your preventive service needs. Take advantage of this benefit every year! 
-All adults over age 72 should receive the recommended pneumonia vaccines. Current USPSTF guidelines recommend a series of two vaccines for the best pneumonia protection.  
-All adults should have a flu vaccine yearly and tetanus vaccine every 10 years. 
-All adults age 48 and older should receive the shingles vaccines (series of two vaccines).       
-All adults age 38-68 who are overweight should have a diabetes screening test once every three years.  
-Other screening tests & preventive services for persons with diabetes include: an eye exam to screen for diabetic retinopathy, a kidney function test, a foot exam, and stricter control over your cholesterol.  
-Cardiovascular screening for adults with routine risk involves an electrocardiogram (ECG) at intervals determined by the provider.  
-Colorectal cancer screening should be done for adults age 54-65 with no increased risk factors for colorectal cancer. There are a number of acceptable methods of screening for this type of cancer. Each test has its own benefits and drawbacks. Discuss with your provider what is most appropriate for you during your annual wellness visit. The different tests include: colonoscopy (considered the best screening method), a fecal occult blood test, a fecal DNA test, and sigmoidoscopy. 
-All adults born between [facility] should be screened once for Hepatitis C. 
-An Abdominal Aortic Aneurysm (AAA) Screening is recommended for men age 73-68 who has ever smoked in their lifetime. Here is a list of your current Health Maintenance items (your personalized list of preventive services) with a due date: 
Health Maintenance Due Topic Date Due  Glaucoma Screening   10/08/2019  Pneumococcal Vaccine (1 of 1 - PPSV23) 10/08/2019  Cholesterol Test   07/01/2020  Flu Vaccine  08/01/2020

## 2020-08-21 ENCOUNTER — HOSPITAL ENCOUNTER (OUTPATIENT)
Dept: LAB | Age: 65
Discharge: HOME | End: 2020-08-21
Payer: MEDICARE

## 2020-08-21 DIAGNOSIS — N18.30 STAGE 3 CHRONIC KIDNEY DISEASE (HCC): ICD-10-CM

## 2020-08-21 DIAGNOSIS — I25.10 CORONARY ARTERY DISEASE INVOLVING NATIVE CORONARY ARTERY OF NATIVE HEART WITHOUT ANGINA PECTORIS: ICD-10-CM

## 2020-08-21 LAB
ALBUMIN SERPL-MCNC: 4.1 G/DL (ref 3.4–5)
ALBUMIN/GLOB SERPL: 1.3 {RATIO} (ref 0.8–1.7)
ALP SERPL-CCNC: 90 U/L (ref 45–117)
ALT SERPL-CCNC: 32 U/L (ref 16–61)
ANION GAP SERPL CALC-SCNC: 7 MMOL/L (ref 3–18)
AST SERPL-CCNC: 23 U/L (ref 10–38)
BILIRUB SERPL-MCNC: 1 MG/DL (ref 0.2–1)
BUN SERPL-MCNC: 21 MG/DL (ref 7–18)
BUN/CREAT SERPL: 18 (ref 12–20)
CALCIUM SERPL-MCNC: 8.3 MG/DL (ref 8.5–10.1)
CHLORIDE SERPL-SCNC: 106 MMOL/L (ref 100–111)
CHOLEST SERPL-MCNC: 144 MG/DL
CO2 SERPL-SCNC: 27 MMOL/L (ref 21–32)
CREAT SERPL-MCNC: 1.14 MG/DL (ref 0.6–1.3)
ERYTHROCYTE [DISTWIDTH] IN BLOOD BY AUTOMATED COUNT: 13.1 % (ref 11.6–14.5)
GLOBULIN SER CALC-MCNC: 3.1 G/DL (ref 2–4)
GLUCOSE SERPL-MCNC: 78 MG/DL (ref 74–99)
HCT VFR BLD AUTO: 46.9 % (ref 36–48)
HDLC SERPL-MCNC: 49 MG/DL (ref 40–60)
HDLC SERPL: 2.9 {RATIO} (ref 0–5)
HGB BLD-MCNC: 16.3 G/DL (ref 13–16)
LDLC SERPL CALC-MCNC: 72.4 MG/DL (ref 0–100)
LIPID PROFILE,FLP: NORMAL
MCH RBC QN AUTO: 32.7 PG (ref 24–34)
MCHC RBC AUTO-ENTMCNC: 34.8 G/DL (ref 31–37)
MCV RBC AUTO: 94.2 FL (ref 74–97)
PLATELET # BLD AUTO: 237 K/UL (ref 135–420)
PMV BLD AUTO: 9.9 FL (ref 9.2–11.8)
POTASSIUM SERPL-SCNC: 4.1 MMOL/L (ref 3.5–5.5)
PROT SERPL-MCNC: 7.2 G/DL (ref 6.4–8.2)
RBC # BLD AUTO: 4.98 M/UL (ref 4.7–5.5)
SODIUM SERPL-SCNC: 140 MMOL/L (ref 136–145)
TRIGL SERPL-MCNC: 113 MG/DL (ref ?–150)
VLDLC SERPL CALC-MCNC: 22.6 MG/DL
WBC # BLD AUTO: 6.8 K/UL (ref 4.6–13.2)

## 2020-08-21 PROCEDURE — 80053 COMPREHEN METABOLIC PANEL: CPT

## 2020-08-21 PROCEDURE — 80061 LIPID PANEL: CPT

## 2020-08-21 PROCEDURE — 36415 COLL VENOUS BLD VENIPUNCTURE: CPT

## 2020-08-21 PROCEDURE — 85027 COMPLETE CBC AUTOMATED: CPT

## 2020-11-25 ENCOUNTER — OFFICE VISIT (OUTPATIENT)
Dept: FAMILY MEDICINE CLINIC | Age: 65
End: 2020-11-25
Payer: MEDICARE

## 2020-11-25 VITALS
SYSTOLIC BLOOD PRESSURE: 118 MMHG | HEIGHT: 65 IN | BODY MASS INDEX: 35.92 KG/M2 | RESPIRATION RATE: 15 BRPM | TEMPERATURE: 97.3 F | WEIGHT: 215.6 LBS | HEART RATE: 60 BPM | OXYGEN SATURATION: 99 % | DIASTOLIC BLOOD PRESSURE: 80 MMHG

## 2020-11-25 DIAGNOSIS — N18.31 STAGE 3A CHRONIC KIDNEY DISEASE (HCC): ICD-10-CM

## 2020-11-25 DIAGNOSIS — I25.10 CORONARY ARTERY DISEASE INVOLVING NATIVE CORONARY ARTERY OF NATIVE HEART WITHOUT ANGINA PECTORIS: ICD-10-CM

## 2020-11-25 DIAGNOSIS — I10 ESSENTIAL HYPERTENSION: Primary | ICD-10-CM

## 2020-11-25 DIAGNOSIS — E66.01 CLASS 2 SEVERE OBESITY DUE TO EXCESS CALORIES WITH SERIOUS COMORBIDITY AND BODY MASS INDEX (BMI) OF 37.0 TO 37.9 IN ADULT (HCC): ICD-10-CM

## 2020-11-25 DIAGNOSIS — I25.5 ISCHEMIC CARDIOMYOPATHY: ICD-10-CM

## 2020-11-25 PROCEDURE — G8510 SCR DEP NEG, NO PLAN REQD: HCPCS | Performed by: LEGAL MEDICINE

## 2020-11-25 PROCEDURE — G8536 NO DOC ELDER MAL SCRN: HCPCS | Performed by: LEGAL MEDICINE

## 2020-11-25 PROCEDURE — 1101F PT FALLS ASSESS-DOCD LE1/YR: CPT | Performed by: LEGAL MEDICINE

## 2020-11-25 PROCEDURE — G8754 DIAS BP LESS 90: HCPCS | Performed by: LEGAL MEDICINE

## 2020-11-25 PROCEDURE — 99214 OFFICE O/P EST MOD 30 MIN: CPT | Performed by: LEGAL MEDICINE

## 2020-11-25 PROCEDURE — G0463 HOSPITAL OUTPT CLINIC VISIT: HCPCS | Performed by: LEGAL MEDICINE

## 2020-11-25 PROCEDURE — G8752 SYS BP LESS 140: HCPCS | Performed by: LEGAL MEDICINE

## 2020-11-25 PROCEDURE — G8417 CALC BMI ABV UP PARAM F/U: HCPCS | Performed by: LEGAL MEDICINE

## 2020-11-25 PROCEDURE — G8427 DOCREV CUR MEDS BY ELIG CLIN: HCPCS | Performed by: LEGAL MEDICINE

## 2020-11-25 PROCEDURE — 3017F COLORECTAL CA SCREEN DOC REV: CPT | Performed by: LEGAL MEDICINE

## 2020-11-25 NOTE — PROGRESS NOTES
Nina Jordan is a 76 y.o. male (: 1955) presenting to address:    Chief Complaint   Patient presents with    [address]:    20 0950   BP: 118/80   Pulse: 60   Resp: 15   Temp: 97.3 °F (36.3 °C)   TempSrc: Temporal   SpO2: 99%   Weight: 215 lb 9.6 oz (97.8 kg)   Height: 5' 5\" (1.651 m)   PainSc:   0 - No pain       Hearing/Vision:   No exam data present    Learning Assessment:     Learning Assessment 2019   PRIMARY LEARNER Patient   HIGHEST LEVEL OF EDUCATION - PRIMARY LEARNER  2 YEARS OF COLLEGE   BARRIERS PRIMARY LEARNER NONE   CO-LEARNER CAREGIVER No   PRIMARY LANGUAGE ENGLISH    NEED No   LEARNER PREFERENCE PRIMARY READING   LEARNING SPECIAL TOPICS none   ANSWERED BY patient   RELATIONSHIP SELF   ASSESSMENT COMMENT n/a     Depression Screening:     3 most recent PHQ Screens 2020   [facility] 36 Not Done Patient Decline   Little interest or pleasure in doing things Not at all   Feeling down, depressed, irritable, or hopeless Not at all   Total Score PHQ 2 0     Fall Risk Assessment:     Fall Risk Assessment, last 12 mths 2020   Able to walk? Yes   Fall in past 12 months? No     Abuse Screening:     Abuse Screening Questionnaire 2020   Do you ever feel afraid of your partner? N   Are you in a relationship with someone who physically or mentally threatens you? N   Is it safe for you to go home? Y     Coordination of Care Questionaire:   1. Have you been to the ER, urgent care clinic since your last visit? Hospitalized since your last visit? NO    2. Have you seen or consulted any other health care providers outside of the [address] [place] [address] since your last visit? Include any pap smears or colon screening. NO    Advanced Directive:   1. Do you have an Advanced Directive? NO    2. Would you like information on Advanced Directives?  YES

## 2020-11-25 NOTE — PROGRESS NOTES
Timmy Morin     Chief Complaint   Patient presents with    Transitions Of Care     Vitals:    11/25/20 0950   BP: 118/80   Pulse: 60   Resp: 15   Temp: 97.3 °F (36.3 °C)   TempSrc: Temporal   SpO2: 99%   Weight: 215 lb 9.6 oz (97.8 kg)   Height: 5' 5\" (1.651 m)   PainSc:   0 - No pain         HPI: Patient is here to establish care with a new provider    Since his PCP is leaving the clinic early next year     he has no acute complaint today. He has a history of coronary disease and MI , he has been doing well exercising daily, cardio and weightlifting. He is quite adherent with medications he is following up with cardiology for his coronary disease        Also he is following up with ophthalmology for history of coronary transplant      He already had pneumonia and flu vaccine          Past Medical History:   Diagnosis Date    Atherosclerotic heart disease of native coronary artery with angina pectoris ([address]     Cataract     Hypercholesterolemia     Hypertension     Ischemic cardiomyopathy      Past Surgical History:   Procedure Laterality Date    HX CORNEAL TRANSPLANT Right 2016    HX CORNEAL TRANSPLANT Left 2018    HX CORONARY STENT PLACEMENT  03/2015    HX HIP REPLACEMENT Bilateral 2011     Social History     Tobacco Use    Smoking status: Former Smoker    Smokeless tobacco: Never Used   Substance Use Topics    Alcohol use: Yes     Frequency: 2-3 times a week     Drinks per session: 1 or 2     Binge frequency: Never       Family History   Problem Relation Age of Onset    Heart Disease Father        Review of Systems   Constitutional: Negative for chills, fever, malaise/fatigue and weight loss. HENT: Negative for congestion, ear discharge, ear pain, hearing loss, nosebleeds, sinus pain and sore throat. Eyes: Negative for blurred vision, double vision and discharge. Respiratory: Negative for cough, hemoptysis, sputum production, shortness of breath and wheezing.     Cardiovascular: Negative for chest pain, palpitations, claudication and leg swelling. Gastrointestinal: Negative for abdominal pain, constipation, diarrhea, nausea and vomiting. Genitourinary: Negative for dysuria, frequency and urgency. Musculoskeletal: Negative for back pain, falls, joint pain, myalgias and neck pain. Skin: Negative for itching and rash. Neurological: Negative for dizziness, tingling, sensory change, speech change, focal weakness, weakness and headaches. Psychiatric/Behavioral: Negative for depression, hallucinations, substance abuse and suicidal ideas. The patient is not nervous/anxious and does not have insomnia. Physical Exam  Vitals signs and nursing note reviewed. Constitutional:       General: He is not in acute distress. Appearance: He is well-developed. He is not diaphoretic. HENT:      Head: Normocephalic and atraumatic. Eyes:      General: No scleral icterus. Right eye: No discharge. Left eye: No discharge. Conjunctiva/sclera: Conjunctivae normal.      Pupils: Pupils are equal, round, and reactive to light. Neck:      Musculoskeletal: Normal range of motion and neck supple. Thyroid: No thyromegaly. Cardiovascular:      Rate and Rhythm: Normal rate and regular rhythm. Heart sounds: Normal heart sounds. Pulmonary:      Effort: Pulmonary effort is normal. No respiratory distress. Breath sounds: Normal breath sounds. No rales. Abdominal:      General: Bowel sounds are normal. There is no distension. Palpations: Abdomen is soft. There is no mass. Tenderness: There is no abdominal tenderness. There is no rebound. Musculoskeletal: Normal range of motion. General: No tenderness or deformity. Lymphadenopathy:      Cervical: No cervical adenopathy. Skin:     General: Skin is warm and dry. Findings: No erythema or rash. Neurological:      Mental Status: He is alert and oriented to person, place, and time.       Cranial Nerves: No cranial nerve deficit. Coordination: Coordination normal.   Psychiatric:         Thought Content: Thought content normal.         Judgment: Judgment normal.          Assessment and plan     Plan of care has been discussed with the patient, he agrees to the plan and verbalized understanding. All his questions were answered  More than 50% of the time spent in this visit was counseling the patient about  illness and treatment options         1. Class 2 severe obesity due to excess calories with serious comorbidity and body mass index (BMI) of 37.0 to 37.9 in adult [facility])  Lifestyle modification has been discussed with patient in details, decrease carbohydrates, decrease or eliminate sugar intake, gradually increase physical activity as tolerated to be about 4 to 5 hours a week. 2. Coronary artery disease involving native coronary artery of native heart without angina pectoris  Is stable is following up with cardiology  - CBC WITH AUTOMATED DIFF; Future    3. Ischemic cardiomyopathy  Stable with improved ejection fraction  - CBC WITH AUTOMATED DIFF; Future    4. Essential hypertension  Well-controlled    5. Stage 3a chronic kidney disease  Last blood test in  August normal kidney function     Current Outpatient Medications   Medication Sig Dispense Refill    lisinopril (PRINIVIL, ZESTRIL) 10 mg tablet Take 1 Tab by mouth daily. 90 Tab 1    aspirin delayed-release 81 mg tablet Take 1 Tab by mouth daily. 90 Tab 1    atorvastatin (LIPITOR) 80 mg tablet Take 1 Tab by mouth daily. 90 Tab 1    carvedilol (COREG) 6.25 mg tablet Take 1 Tab by mouth two (2) times daily (with meals). 180 Tab 1    spironolactone (ALDACTONE) 25 mg tablet Take 1 Tab by mouth daily. 90 Tab 1    furosemide (LASIX) 40 mg tablet Take 1 Tab by mouth daily. (Patient taking differently: Take 40 mg by mouth every other day.) 90 Tab 1    brimonidine-timolol (COMBIGAN) 0.2-0.5 % drop ophthalmic solution Apply 1 Drop to eye daily.  difluprednate (DUREZOL) 0.05 % ophthalmic emulsion Apply 1 Drop to eye daily. Patient Active Problem List    Diagnosis Date Noted    Stage 3 chronic kidney disease 08/20/2019    Class 2 severe obesity due to excess calories with serious comorbidity and body mass index (BMI) of 37.0 to 37.9 in adult [facility]) 08/20/2019    Coronary artery disease involving native coronary artery of native heart without angina pectoris 02/27/2019    Ischemic cardiomyopathy 02/27/2019     Results for orders placed or performed during the hospital encounter of 08/21/20   CBC W/O DIFF   Result Value Ref Range    WBC 6.8 4.6 - 13.2 K/uL    RBC 4.98 4.70 - 5.50 M/uL    HGB 16.3 (H) 13.0 - 16.0 g/dL    HCT 46.9 36.0 - 48.0 %    MCV 94.2 74.0 - 97.0 FL    MCH 32.7 24.0 - 34.0 PG    MCHC 34.8 31.0 - 37.0 g/dL    RDW 13.1 11.6 - 14.5 %    PLATELET 163 472 - 164 K/uL    MPV 9.9 9.2 - 25.9 FL   METABOLIC PANEL, COMPREHENSIVE   Result Value Ref Range    Sodium 140 136 - 145 mmol/L    Potassium 4.1 3.5 - 5.5 mmol/L    Chloride 106 100 - 111 mmol/L    CO2 27 21 - 32 mmol/L    Anion gap 7 3.0 - 18 mmol/L    Glucose 78 74 - 99 mg/dL    BUN 21 (H) 7.0 - 18 MG/DL    Creatinine 1.14 0.6 - 1.3 MG/DL    BUN/Creatinine ratio 18 12 - 20      GFR est AA >60 >60 ml/min/1.73m2    GFR est non-AA >60 >60 ml/min/1.73m2    Calcium 8.3 (L) 8.5 - 10.1 MG/DL    Bilirubin, total 1.0 0.2 - 1.0 MG/DL    ALT (SGPT) 32 16 - 61 U/L    AST (SGOT) 23 10 - 38 U/L    Alk. phosphatase 90 45 - 117 U/L    Protein, total 7.2 6.4 - 8.2 g/dL    Albumin 4.1 3.4 - 5.0 g/dL    Globulin 3.1 2.0 - 4.0 g/dL    A-G Ratio 1.3 0.8 - 1.7     LIPID PANEL   Result Value Ref Range    LIPID PROFILE          Cholesterol, total 144 <200 MG/DL    Triglyceride 113 <150 MG/DL    HDL Cholesterol 49 40 - 60 MG/DL    LDL, calculated 72.4 0 - 100 MG/DL    VLDL, calculated 22.6 MG/DL    CHOL/HDL Ratio 2.9 0 - 5.0       No visits with results within 3 Month(s) from this visit.    Latest known visit with results is:   Hospital Outpatient Visit on 08/21/2020   Component Date Value Ref Range Status    WBC 08/21/2020 6.8  4.6 - 13.2 K/uL Final    RBC 08/21/2020 4.98  4.70 - 5.50 M/uL Final    HGB 08/21/2020 16.3* 13.0 - 16.0 g/dL Final    HCT 08/21/2020 46.9  36.0 - 48.0 % Final    MCV 08/21/2020 94.2  74.0 - 97.0 FL Final    MCH 08/21/2020 32.7  24.0 - 34.0 PG Final    MCHC 08/21/2020 34.8  31.0 - 37.0 g/dL Final    RDW 08/21/2020 13.1  11.6 - 14.5 % Final    PLATELET 40/05/7316 213  135 - 420 K/uL Final    MPV 08/21/2020 9.9  9.2 - 11.8 FL Final    Sodium 08/21/2020 140  136 - 145 mmol/L Final    Potassium 08/21/2020 4.1  3.5 - 5.5 mmol/L Final    Chloride 08/21/2020 106  100 - 111 mmol/L Final    CO2 08/21/2020 27  21 - 32 mmol/L Final    Anion gap 08/21/2020 7  3.0 - 18 mmol/L Final    Glucose 08/21/2020 78  74 - 99 mg/dL Final    BUN 08/21/2020 21* 7.0 - 18 MG/DL Final    Creatinine 08/21/2020 1.14  0.6 - 1.3 MG/DL Final    BUN/Creatinine ratio 08/21/2020 18  12 - 20   Final    GFR est AA 08/21/2020 >60  >60 ml/min/1.73m2 Final    GFR est non-AA 08/21/2020 >60  >60 ml/min/1.73m2 Final    Comment: (NOTE)  Estimated GFR is calculated using the Modification of Diet in Renal   Disease (MDRD) Study equation, reported for both  Americans   (GFRAA) and non- Americans (GFRNA), and normalized to 1.73m2   body surface area. The physician must decide which value applies to   the patient. The MDRD study equation should only be used in   individuals age 25 or older. It has not been validated for the   following: pregnant women, patients with serious comorbid conditions,   or on certain medications, or persons with extremes of body size,   muscle mass, or nutritional status.       Calcium 08/21/2020 8.3* 8.5 - 10.1 MG/DL Final    Bilirubin, total 08/21/2020 1.0  0.2 - 1.0 MG/DL Final    ALT (SGPT) 08/21/2020 32  16 - 61 U/L Final    AST (SGOT) 08/21/2020 23  10 - 38 U/L Final    Alk. phosphatase 08/21/2020 90  45 - 117 U/L Final    Protein, total 08/21/2020 7.2  6.4 - 8.2 g/dL Final    Albumin 08/21/2020 4.1  3.4 - 5.0 g/dL Final    Globulin 08/21/2020 3.1  2.0 - 4.0 g/dL Final    A-G Ratio 08/21/2020 1.3  0.8 - 1.7   Final    LIPID PROFILE 08/21/2020        Final    Cholesterol, total 08/21/2020 144  <200 MG/DL Final    Triglyceride 08/21/2020 113  <150 MG/DL Final    Comment: The drugs N-acetylcysteine (NAC) and  Metamiszole have been found to cause falsely  low results in this chemical assay. Please  be sure to submit blood samples obtained  BEFORE administration of either of these  drugs to assure correct results.  HDL Cholesterol 08/21/2020 49  40 - 60 MG/DL Final    LDL, calculated 08/21/2020 72.4  0 - 100 MG/DL Final    VLDL, calculated 08/21/2020 22.6  MG/DL Final    CHOL/HDL Ratio 08/21/2020 2.9  0 - 5.0   Final          Follow-up and Dispositions    · Return in about 3 months (around 2/25/2021).

## 2021-09-30 ENCOUNTER — TELEPHONE (OUTPATIENT)
Dept: FAMILY MEDICINE CLINIC | Age: 66
End: 2021-09-30

## 2021-09-30 DIAGNOSIS — E78.00 PURE HYPERCHOLESTEROLEMIA: ICD-10-CM

## 2021-09-30 DIAGNOSIS — Z13.1 SCREENING FOR DIABETES MELLITUS (DM): ICD-10-CM

## 2021-09-30 DIAGNOSIS — R79.9 ABNORMAL FINDING OF BLOOD CHEMISTRY, UNSPECIFIED: ICD-10-CM

## 2021-09-30 DIAGNOSIS — Z12.5 SCREENING FOR PROSTATE CANCER: ICD-10-CM

## 2021-09-30 DIAGNOSIS — N18.31 STAGE 3A CHRONIC KIDNEY DISEASE (HCC): ICD-10-CM

## 2021-09-30 DIAGNOSIS — I25.5 ISCHEMIC CARDIOMYOPATHY: Primary | ICD-10-CM

## 2021-09-30 DIAGNOSIS — I10 ESSENTIAL HYPERTENSION: ICD-10-CM

## 2021-09-30 NOTE — TELEPHONE ENCOUNTER
Patient is due for follow-up and blood work    As well as Medicare wellness    Patient need to schedule 45 minutes appointment follow-up/Medicare wellness    And to get labs and blood work done 1 week prior to his appointment

## 2021-10-06 ENCOUNTER — OFFICE VISIT (OUTPATIENT)
Dept: FAMILY MEDICINE CLINIC | Age: 66
End: 2021-10-06
Payer: MEDICARE

## 2021-10-06 VITALS
SYSTOLIC BLOOD PRESSURE: 112 MMHG | WEIGHT: 223.6 LBS | RESPIRATION RATE: 16 BRPM | BODY MASS INDEX: 37.25 KG/M2 | TEMPERATURE: 97.3 F | DIASTOLIC BLOOD PRESSURE: 70 MMHG | HEART RATE: 59 BPM | OXYGEN SATURATION: 97 % | HEIGHT: 65 IN

## 2021-10-06 DIAGNOSIS — M25.571 RIGHT ANKLE PAIN, UNSPECIFIED CHRONICITY: Primary | ICD-10-CM

## 2021-10-06 DIAGNOSIS — S76.311A STRAIN OF RIGHT HAMSTRING, INITIAL ENCOUNTER: ICD-10-CM

## 2021-10-06 PROCEDURE — G8510 SCR DEP NEG, NO PLAN REQD: HCPCS | Performed by: LEGAL MEDICINE

## 2021-10-06 PROCEDURE — 99213 OFFICE O/P EST LOW 20 MIN: CPT | Performed by: LEGAL MEDICINE

## 2021-10-06 PROCEDURE — G8427 DOCREV CUR MEDS BY ELIG CLIN: HCPCS | Performed by: LEGAL MEDICINE

## 2021-10-06 PROCEDURE — G8417 CALC BMI ABV UP PARAM F/U: HCPCS | Performed by: LEGAL MEDICINE

## 2021-10-06 PROCEDURE — G8536 NO DOC ELDER MAL SCRN: HCPCS | Performed by: LEGAL MEDICINE

## 2021-10-06 PROCEDURE — 3017F COLORECTAL CA SCREEN DOC REV: CPT | Performed by: LEGAL MEDICINE

## 2021-10-06 PROCEDURE — 1101F PT FALLS ASSESS-DOCD LE1/YR: CPT | Performed by: LEGAL MEDICINE
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
## 2021-10-06 NOTE — PROGRESS NOTES
Moi Breen is a 76 y.o. male (: 1955) presenting to address:    Chief Complaint   Patient presents with    Ankle Pain     sharp pain in right ankle; pulled muscle on right hamstring       Vitals:    10/06/21 0808   BP: 112/70   Pulse: (!) 59   Resp: 16   Temp: 97.3 °F (36.3 °C)   TempSrc: Temporal   SpO2: 97%   Weight: 223 lb 9.6 oz (101.4 kg)   Height: 5' 5\" (1.651 m)   PainSc:   0 - No pain       Hearing/Vision:   No exam data present    Learning Assessment:     Learning Assessment 2019   PRIMARY LEARNER Patient   HIGHEST LEVEL OF EDUCATION - PRIMARY LEARNER  2 YEARS OF COLLEGE   BARRIERS PRIMARY LEARNER NONE   CO-LEARNER CAREGIVER No   PRIMARY LANGUAGE ENGLISH    NEED No   LEARNER PREFERENCE PRIMARY READING   LEARNING SPECIAL TOPICS none   ANSWERED BY patient   RELATIONSHIP SELF   ASSESSMENT COMMENT n/a     Depression Screening:     3 most recent PHQ Screens 10/6/2021   PHQ Not Done -   Little interest or pleasure in doing things Not at all   Feeling down, depressed, irritable, or hopeless Not at all   Total Score PHQ 2 0     Fall Risk Assessment:     Fall Risk Assessment, last 12 mths 10/6/2021   Able to walk? Yes   Fall in past 12 months? 1   Do you feel unsteady? 0   Are you worried about falling 0   Is the gait abnormal? 0   Number of falls in past 12 months 1   Fall with injury? 1     Abuse Screening:     Abuse Screening Questionnaire 2020   Do you ever feel afraid of your partner? N   Are you in a relationship with someone who physically or mentally threatens you? N   Is it safe for you to go home? Y     Coordination of Care Questionaire:   1. Have you been to the ER, urgent care clinic since your last visit? Hospitalized since your last visit? NO    2. Have you seen or consulted any other health care providers outside of the [address] [place] [address] since your last visit? Include any pap smears or colon screening. YES, ophthalmology    Advanced Directive:   1.  Do you have an Advanced Directive? NO    2. Would you like information on Advanced Directives? NO    Patient DECLINED flu vaccine.

## 2021-10-14 ENCOUNTER — APPOINTMENT (OUTPATIENT)
Dept: FAMILY MEDICINE CLINIC | Age: 66
End: 2021-10-14

## 2021-10-14 ENCOUNTER — HOSPITAL ENCOUNTER (OUTPATIENT)
Dept: LAB | Age: 66
Discharge: HOME | End: 2021-10-14
Payer: MEDICARE

## 2021-10-14 DIAGNOSIS — Z12.5 SCREENING FOR PROSTATE CANCER: ICD-10-CM

## 2021-10-14 DIAGNOSIS — I10 ESSENTIAL HYPERTENSION: ICD-10-CM

## 2021-10-14 DIAGNOSIS — Z13.1 SCREENING FOR DIABETES MELLITUS (DM): ICD-10-CM

## 2021-10-14 DIAGNOSIS — N18.31 STAGE 3A CHRONIC KIDNEY DISEASE (HCC): ICD-10-CM

## 2021-10-14 DIAGNOSIS — E78.00 PURE HYPERCHOLESTEROLEMIA: ICD-10-CM

## 2021-10-14 DIAGNOSIS — R79.9 ABNORMAL FINDING OF BLOOD CHEMISTRY, UNSPECIFIED: ICD-10-CM

## 2021-10-14 PROCEDURE — 84153 ASSAY OF PSA TOTAL: CPT

## 2021-10-14 PROCEDURE — 80053 COMPREHEN METABOLIC PANEL: CPT

## 2021-10-14 PROCEDURE — 80061 LIPID PANEL: CPT

## 2021-10-14 PROCEDURE — 85025 COMPLETE CBC W/AUTO DIFF WBC: CPT

## 2021-10-14 PROCEDURE — 36415 COLL VENOUS BLD VENIPUNCTURE: CPT

## 2021-10-14 PROCEDURE — 83036 HEMOGLOBIN GLYCOSYLATED A1C: CPT

## 2021-10-15 LAB
ALBUMIN SERPL-MCNC: 3.9 G/DL (ref 3.4–5)
ALBUMIN/GLOB SERPL: 1.3 {RATIO} (ref 0.8–1.7)
ALP SERPL-CCNC: 80 U/L (ref 45–117)
ALT SERPL-CCNC: 38 U/L (ref 16–61)
ANION GAP SERPL CALC-SCNC: 4 MMOL/L (ref 3–18)
AST SERPL-CCNC: 21 U/L (ref 10–38)
BASOPHILS # BLD: 0.1 K/UL (ref 0–0.1)
BASOPHILS NFR BLD: 1 % (ref 0–2)
BILIRUB SERPL-MCNC: 0.6 MG/DL (ref 0.2–1)
BUN SERPL-MCNC: 22 MG/DL (ref 7–18)
BUN/CREAT SERPL: 21 (ref 12–20)
CALCIUM SERPL-MCNC: 8.4 MG/DL (ref 8.5–10.1)
CHLORIDE SERPL-SCNC: 106 MMOL/L (ref 100–111)
CHOLEST SERPL-MCNC: 128 MG/DL
CO2 SERPL-SCNC: 25 MMOL/L (ref 21–32)
CREAT SERPL-MCNC: 1.05 MG/DL (ref 0.6–1.3)
DIFFERENTIAL METHOD BLD: ABNORMAL
EOSINOPHIL # BLD: 0.1 K/UL (ref 0–0.4)
EOSINOPHIL NFR BLD: 2 % (ref 0–5)
ERYTHROCYTE [DISTWIDTH] IN BLOOD BY AUTOMATED COUNT: 14.2 % (ref 11.6–14.5)
GLOBULIN SER CALC-MCNC: 3 G/DL (ref 2–4)
GLUCOSE SERPL-MCNC: 95 MG/DL (ref 74–99)
HBA1C MFR BLD: 5.2 % (ref 4.2–5.6)
HCT VFR BLD AUTO: 48.6 % (ref 36–48)
HDLC SERPL-MCNC: 48 MG/DL (ref 40–60)
HDLC SERPL: 2.7 {RATIO} (ref 0–5)
HGB BLD-MCNC: 15.1 G/DL (ref 13–16)
LDLC SERPL CALC-MCNC: 54.6 MG/DL (ref 0–100)
LIPID PROFILE,FLP: NORMAL
LYMPHOCYTES # BLD: 1.8 K/UL (ref 0.9–3.6)
LYMPHOCYTES NFR BLD: 27 % (ref 21–52)
MCH RBC QN AUTO: 31.9 PG (ref 24–34)
MCHC RBC AUTO-ENTMCNC: 31.1 G/DL (ref 31–37)
MCV RBC AUTO: 102.7 FL (ref 78–100)
MONOCYTES # BLD: 0.8 K/UL (ref 0.05–1.2)
MONOCYTES NFR BLD: 12 % (ref 3–10)
NEUTS SEG # BLD: 3.7 K/UL (ref 1.8–8)
NEUTS SEG NFR BLD: 58 % (ref 40–73)
PLATELET # BLD AUTO: 280 K/UL (ref 135–420)
PMV BLD AUTO: 9.2 FL (ref 9.2–11.8)
POTASSIUM SERPL-SCNC: 4.3 MMOL/L (ref 3.5–5.5)
PROT SERPL-MCNC: 6.9 G/DL (ref 6.4–8.2)
PSA SERPL-MCNC: 1.7 NG/ML (ref 0–4)
RBC # BLD AUTO: 4.73 M/UL (ref 4.35–5.65)
SODIUM SERPL-SCNC: 135 MMOL/L (ref 136–145)
TRIGL SERPL-MCNC: 127 MG/DL (ref ?–150)
VLDLC SERPL CALC-MCNC: 25.4 MG/DL
WBC # BLD AUTO: 6.5 K/UL (ref 4.6–13.2)

## 2021-10-21 ENCOUNTER — TELEPHONE (OUTPATIENT)
Dept: FAMILY MEDICINE CLINIC | Age: 66
End: 2021-10-21

## 2021-10-21 NOTE — TELEPHONE ENCOUNTER
Kar Fabi Matiasmilton is following up to see if paperwork that was faxed again on 10/16/21 is signed. He states pt is awaiting medical equipment.  Please assist.

## 2021-10-21 NOTE — TELEPHONE ENCOUNTER
Spoke w/Spring Donald from [employer] and he will have the form faxed to our office directly, I will confirm receipt of the fax to [place].

## 2021-10-25 NOTE — TELEPHONE ENCOUNTER
Received the form from [facility]; consulted w/PCP and we do not have any documentation to support this request; spoke w/pt that he needs to schedule an appt, pt declined the appointment.

## 2021-10-27 ENCOUNTER — TELEPHONE (OUTPATIENT)
Dept: FAMILY MEDICINE CLINIC | Age: 66
End: 2021-10-27

## 2021-10-27 NOTE — TELEPHONE ENCOUNTER
Ari Rae, the rep from [employer], pt declined to be evaluated for the knee brace, therefore PCP will not sign off on form.

## 2021-10-27 NOTE — TELEPHONE ENCOUNTER
Calling in regards to a fax that was sent over on the 16th addressed to [facility].  Please advise

## 2022-03-19 PROBLEM — I25.5 ISCHEMIC CARDIOMYOPATHY: Status: ACTIVE | Noted: 2019-02-27

## 2022-03-19 PROBLEM — I25.10 CORONARY ARTERY DISEASE INVOLVING NATIVE CORONARY ARTERY OF NATIVE HEART WITHOUT ANGINA PECTORIS: Status: ACTIVE | Noted: 2019-02-27

## 2022-03-19 PROBLEM — N18.30 STAGE 3 CHRONIC KIDNEY DISEASE (HCC): Status: ACTIVE | Noted: 2019-08-20

## 2022-03-20 PROBLEM — E66.01 CLASS 2 SEVERE OBESITY DUE TO EXCESS CALORIES WITH SERIOUS COMORBIDITY AND BODY MASS INDEX (BMI) OF 37.0 TO 37.9 IN ADULT (HCC): Status: ACTIVE | Noted: 2019-08-20

## 2022-07-18 ENCOUNTER — OFFICE VISIT (OUTPATIENT)
Dept: FAMILY MEDICINE CLINIC | Age: 67
End: 2022-07-18
Payer: MEDICARE

## 2022-07-18 VITALS
SYSTOLIC BLOOD PRESSURE: 110 MMHG | TEMPERATURE: 97.8 F | HEART RATE: 64 BPM | BODY MASS INDEX: 36.09 KG/M2 | OXYGEN SATURATION: 97 % | WEIGHT: 216.6 LBS | RESPIRATION RATE: 15 BRPM | DIASTOLIC BLOOD PRESSURE: 70 MMHG | HEIGHT: 65 IN

## 2022-07-18 DIAGNOSIS — E66.01 SEVERE OBESITY (BMI 35.0-39.9) WITH COMORBIDITY (HCC): ICD-10-CM

## 2022-07-18 DIAGNOSIS — E78.00 PURE HYPERCHOLESTEROLEMIA: ICD-10-CM

## 2022-07-18 DIAGNOSIS — I25.10 CORONARY ARTERY DISEASE INVOLVING NATIVE CORONARY ARTERY OF NATIVE HEART WITHOUT ANGINA PECTORIS: ICD-10-CM

## 2022-07-18 DIAGNOSIS — Z23 ENCOUNTER FOR IMMUNIZATION: ICD-10-CM

## 2022-07-18 DIAGNOSIS — I25.5 ISCHEMIC CARDIOMYOPATHY: ICD-10-CM

## 2022-07-18 DIAGNOSIS — I10 ESSENTIAL HYPERTENSION: ICD-10-CM

## 2022-07-18 DIAGNOSIS — R79.9 ABNORMAL FINDING OF BLOOD CHEMISTRY, UNSPECIFIED: ICD-10-CM

## 2022-07-18 DIAGNOSIS — Z00.00 MEDICARE ANNUAL WELLNESS VISIT, SUBSEQUENT: Primary | ICD-10-CM

## 2022-07-18 PROBLEM — N18.30 STAGE 3 CHRONIC KIDNEY DISEASE (HCC): Status: RESOLVED | Noted: 2019-08-20 | Resolved: 2022-07-18

## 2022-07-18 PROCEDURE — 1101F PT FALLS ASSESS-DOCD LE1/YR: CPT | Performed by: LEGAL MEDICINE

## 2022-07-18 PROCEDURE — G8417 CALC BMI ABV UP PARAM F/U: HCPCS | Performed by: LEGAL MEDICINE

## 2022-07-18 PROCEDURE — G8536 NO DOC ELDER MAL SCRN: HCPCS | Performed by: LEGAL MEDICINE

## 2022-07-18 PROCEDURE — G0009 ADMIN PNEUMOCOCCAL VACCINE: HCPCS | Performed by: LEGAL MEDICINE

## 2022-07-18 PROCEDURE — G0439 PPPS, SUBSEQ VISIT: HCPCS | Performed by: LEGAL MEDICINE

## 2022-07-18 PROCEDURE — G8427 DOCREV CUR MEDS BY ELIG CLIN: HCPCS | Performed by: LEGAL MEDICINE

## 2022-07-18 PROCEDURE — 1123F ACP DISCUSS/DSCN MKR DOCD: CPT | Performed by: LEGAL MEDICINE

## 2022-07-18 PROCEDURE — 3017F COLORECTAL CA SCREEN DOC REV: CPT | Performed by: LEGAL MEDICINE

## 2022-07-18 PROCEDURE — 90677 PCV20 VACCINE IM: CPT | Performed by: LEGAL MEDICINE

## 2022-07-18 PROCEDURE — G8432 DEP SCR NOT DOC, RNG: HCPCS | Performed by: LEGAL MEDICINE

## 2022-07-18 RX ORDER — ATORVASTATIN CALCIUM 80 MG/1
80 TABLET, FILM COATED ORAL DAILY
Qty: 90 TABLET | Refills: 3 | Status: SHIPPED | OUTPATIENT
Start: 2022-07-18

## 2022-07-18 RX ORDER — NITROGLYCERIN 0.4 MG/1
0.4 TABLET SUBLINGUAL
COMMUNITY
Start: 2021-08-31 | End: 2022-08-31

## 2022-07-18 RX ORDER — LISINOPRIL 10 MG/1
10 TABLET ORAL DAILY
Qty: 90 TABLET | Refills: 1 | Status: SHIPPED | OUTPATIENT
Start: 2022-07-18

## 2022-07-18 RX ORDER — SPIRONOLACTONE 25 MG/1
25 TABLET ORAL DAILY
Qty: 90 TABLET | Refills: 3 | Status: SHIPPED | OUTPATIENT
Start: 2022-07-18

## 2022-07-18 RX ORDER — ASPIRIN 81 MG/1
81 TABLET ORAL DAILY
Qty: 90 TABLET | Refills: 3 | Status: SHIPPED | OUTPATIENT
Start: 2022-07-18

## 2022-07-18 RX ORDER — FUROSEMIDE 40 MG/1
40 TABLET ORAL DAILY
Qty: 90 TABLET | Refills: 3 | Status: SHIPPED | OUTPATIENT
Start: 2022-07-18

## 2022-07-18 RX ORDER — CARVEDILOL 6.25 MG/1
6.25 TABLET ORAL 2 TIMES DAILY WITH MEALS
Qty: 180 TABLET | Refills: 1 | Status: SHIPPED | OUTPATIENT
Start: 2022-07-18

## 2022-07-18 NOTE — PROGRESS NOTES
Immunization PPSV-20 administered 7/18/2022 by Sharon Millan. Patient tolerated procedure well. No reactions noted.

## 2022-07-18 NOTE — PROGRESS NOTES
Angela Parry is a 78 y.o. male (: 1955) presenting to address:    Chief Complaint   Patient presents with    Annual Wellness Visit       Vitals:    22 1118   BP: 110/70   Pulse: 64   Resp: 15   Temp: 97.8 °F (36.6 °C)   TempSrc: Temporal   SpO2: 97%   Weight: 216 lb 9.6 oz (98.2 kg)   Height: 5' 5\" (1.651 m)   PainSc:   3   PainLoc: Back       Hearing/Vision:   No exam data present    Learning Assessment:     Learning Assessment 2019   PRIMARY LEARNER Patient   HIGHEST LEVEL OF EDUCATION - PRIMARY LEARNER  2 YEARS OF COLLEGE   BARRIERS PRIMARY LEARNER NONE   CO-LEARNER CAREGIVER No   PRIMARY LANGUAGE ENGLISH    NEED No   LEARNER PREFERENCE PRIMARY READING   LEARNING SPECIAL TOPICS none   ANSWERED BY patient   RELATIONSHIP SELF   ASSESSMENT COMMENT n/a     Depression Screening:     3 most recent PHQ Screens 2022   PHQ Not Done -   Little interest or pleasure in doing things Not at all   Feeling down, depressed, irritable, or hopeless Not at all   Total Score PHQ 2 0     Fall Risk Assessment:     Fall Risk Assessment, last 12 mths 2022   Able to walk? Yes   Fall in past 12 months? 0   Do you feel unsteady? 0   Are you worried about falling 0   Is the gait abnormal? -   Number of falls in past 12 months -   Fall with injury? -     Abuse Screening:     Abuse Screening Questionnaire 2022   Do you ever feel afraid of your partner? N   Are you in a relationship with someone who physically or mentally threatens you? N   Is it safe for you to go home?  Y     ADL Assessment:     ADL Assessment 2020   Feeding yourself No Help Needed   Getting from bed to chair No Help Needed   Getting dressed No Help Needed   Bathing or showering No Help Needed   Walk across the room (includes cane/walker) No Help Needed   Using the telphone No Help Needed   Taking your medications No Help Needed   Preparing meals No Help Needed   Managing money (expenses/bills) No Help Needed   Moderately strenuous housework (laundry) No Help Needed   Shopping for personal items (toiletries/medicines) No Help Needed   Shopping for groceries No Help Needed   Driving No Help Needed   Climbing a flight of stairs No Help Needed   Getting to places beyond walking distances No Help Needed        Coordination of Care Questionaire:   1. \"Have you been to the ER, urgent care clinic since your last visit? Hospitalized since your last visit? \" No    2. \"Have you seen or consulted any other health care providers outside of the [address] [place] [address] [facility] since your last visit? \" Dental; cardiology     3. For patients aged 39-70: Has the patient had a colonoscopy / FIT/ Cologuard? Yes - no Care Gap present    If the patient is female:    4. For patients aged 41-77: Has the patient had a mammogram within the past 2 years? NA - based on age or sex  See top three    5. For patients aged 21-65: Has the patient had a pap smear? NA - based on age or sex    Advanced Directive:   1. Do you have an Advanced Directive? YES    2. Would you like information on Advanced Directives?  NO

## 2022-07-18 NOTE — PROGRESS NOTES
(AWV) The Initial Medicare Annual Wellness Exam PROGRESS NOTE    This is an Initial Medicare Annual Wellness Exam (AWV) (Performed 12 months after IPPE or effective date of Medicare Part B enrollment, Once in a lifetime)    I have reviewed the patient's medical history in detail and updated the computerized patient record. Melly Rondon is a 78 y.o. male and presents for an annual wellness exam   He has been doing well and stable with no acute complaints  He is adherent to all medications    He has noticed easy bruising on his arms while he is working out and then lifting weights, any mild trauma to his arms causes bruising    Patient Active Problem List   Diagnosis Code    Coronary artery disease involving native coronary artery of native heart without angina pectoris I25.10    Ischemic cardiomyopathy I25.5    Class 2 severe obesity due to excess calories with serious comorbidity and body mass index (BMI) of 37.0 to 37.9 in adult ([facility] E66.01, Z68.37     Patient Active Problem List    Diagnosis Date Noted    Class 2 severe obesity due to excess calories with serious comorbidity and body mass index (BMI) of 37.0 to 37.9 in adult [facility]) 08/20/2019    Coronary artery disease involving native coronary artery of native heart without angina pectoris 02/27/2019    Ischemic cardiomyopathy 02/27/2019     Current Outpatient Medications   Medication Sig Dispense Refill    nitroglycerin (Nitrostat) 0.4 mg SL tablet Take 0.4 mg by mouth.  lisinopriL (PRINIVIL, ZESTRIL) 10 mg tablet Take 1 Tablet by mouth daily. 90 Tablet 1    atorvastatin (LIPITOR) 80 mg tablet Take 1 Tablet by mouth daily. 90 Tablet 3    carvediloL (COREG) 6.25 mg tablet Take 1 Tablet by mouth two (2) times daily (with meals). 180 Tablet 1    spironolactone (ALDACTONE) 25 mg tablet Take 1 Tablet by mouth daily. 90 Tablet 3    furosemide (LASIX) 40 mg tablet Take 1 Tablet by mouth daily.  90 Tablet 3    aspirin delayed-release 81 mg tablet Take 1 Tablet by mouth daily. 90 Tablet 3    brimonidine-timolol (COMBIGAN) 0.2-0.5 % drop ophthalmic solution Apply 1 Drop to eye daily.  difluprednate (DUREZOL) 0.05 % ophthalmic emulsion Apply 1 Drop to eye daily.        No Known Allergies  Past Medical History:   Diagnosis Date    Atherosclerotic heart disease of native coronary artery with angina pectoris (HCC)     Cataract     Hypercholesterolemia     Hypertension     Ischemic cardiomyopathy      Past Surgical History:   Procedure Laterality Date    HX CORNEAL TRANSPLANT Right 2016    HX CORNEAL TRANSPLANT Left 2018    HX CORONARY STENT PLACEMENT  03/2015    HX HIP REPLACEMENT Bilateral 2011     Family History   Problem Relation Age of Onset    Heart Disease Father      Social History     Tobacco Use    Smoking status: Current Some Day Smoker     Types: Cigars    Smokeless tobacco: Never Used   Substance Use Topics    Alcohol use: Yes       ROS   History obtained from chart review and the patient  General ROS: negative for - chills, fatigue or fever  Psychological ROS: negative for - anxiety or depression  Ophthalmic ROS: negative for - blurry vision, decreased vision or double vision  ENT ROS: negative for - epistaxis or headaches    Hematological and Lymphatic ROS: negative for - bleeding problems, blood clots, blood transfusions     Respiratory ROS: no cough, shortness of breath, or wheezing  Cardiovascular ROS: no chest pain or dyspnea on exertion  Gastrointestinal ROS: no abdominal pain, change in bowel habits, or black or bloody stools  Genito-Urinary ROS: no dysuria, trouble voiding, or hematuria  Musculoskeletal ROS: positive for - low back pain     SKIN no moles no lesion just subcutaneous bruises  History     Past Medical History:   Diagnosis Date    Atherosclerotic heart disease of native coronary artery with angina pectoris (HCC)     Cataract     Hypercholesterolemia     Hypertension     Ischemic cardiomyopathy       Past Surgical History:   Procedure Laterality Date    HX CORNEAL TRANSPLANT Right 2016    HX CORNEAL TRANSPLANT Left 2018    HX CORONARY STENT PLACEMENT  03/2015    HX HIP REPLACEMENT Bilateral 2011     Current Outpatient Medications   Medication Sig Dispense Refill    nitroglycerin (Nitrostat) 0.4 mg SL tablet Take 0.4 mg by mouth.  lisinopriL (PRINIVIL, ZESTRIL) 10 mg tablet Take 1 Tablet by mouth daily. 90 Tablet 1    atorvastatin (LIPITOR) 80 mg tablet Take 1 Tablet by mouth daily. 90 Tablet 3    carvediloL (COREG) 6.25 mg tablet Take 1 Tablet by mouth two (2) times daily (with meals). 180 Tablet 1    spironolactone (ALDACTONE) 25 mg tablet Take 1 Tablet by mouth daily. 90 Tablet 3    furosemide (LASIX) 40 mg tablet Take 1 Tablet by mouth daily. 90 Tablet 3    aspirin delayed-release 81 mg tablet Take 1 Tablet by mouth daily. 90 Tablet 3    brimonidine-timolol (COMBIGAN) 0.2-0.5 % drop ophthalmic solution Apply 1 Drop to eye daily.  difluprednate (DUREZOL) 0.05 % ophthalmic emulsion Apply 1 Drop to eye daily.        No Known Allergies  Family History   Problem Relation Age of Onset    Heart Disease Father      Social History     Tobacco Use    Smoking status: Current Some Day Smoker     Types: Cigars    Smokeless tobacco: Never Used   Substance Use Topics    Alcohol use: Yes     Patient Active Problem List   Diagnosis Code    Coronary artery disease involving native coronary artery of native heart without angina pectoris I25.10    Ischemic cardiomyopathy I25.5    Class 2 severe obesity due to excess calories with serious comorbidity and body mass index (BMI) of 37.0 to 37.9 in adult ([facility] E66.01, Z68.37       Health Maintenance History  Immunizations reviewed, he is due for pneumonia will receive it today, he received shingle vaccine at her senior  Colonoscopy: up to date    Chest CT:smoked cigars 2 times per week for 1 week  Eye exam:he sees eye  Milagros Mueller at [facility] Depression Risk Factor Screening:      Patient Health Questionnaire (PHQ-2)   Over the last 2 weeks, how often have you been bothered by any of the following problems? · Little interest or pleasure in doing things? · Not at all. [0]  · Feeling down, depressed, or hopeless? · Not at all. [0]    Total Score: 0/6  PHQ-2 Assessment Scoring:   A score of 2 or more requires further screening with the PHQ-9    Alcohol Risk Factor Screening:     Women: On any occasion during the past 3 months, have you had more than 3 drinks containing alcohol? Do you average more than 7 drinks per week? Men: On any occasion during the past 3 months, have you had more than 4 drinks containing alcohol?yes   Do you average more than 14 drinks per week? no    Functional Ability and Level of Safety:     Hearing Loss    Hearing is good. Activities of Daily Living   Self-care. Requires assistance with: no ADLs    Fall Risk   No fall risk factors    Abuse Screen   Patient is not abused  None    Examination   Physical Examination  Vitals:    07/18/22 1118   BP: 110/70   Pulse: 64   Resp: 15   Temp: 97.8 °F (36.6 °C)   TempSrc: Temporal   SpO2: 97%   Weight: 216 lb 9.6 oz (98.2 kg)   Height: 5' 5\" (1.651 m)   PainSc:   3   PainLoc: Back      Body mass index is 36.04 kg/m².      Evaluation of Cognitive Function:  Mood/affect:Good   Appearance:wel groomed   Family member/caregiver input:not accompanies at [facility] svisit     alert, well appearing, and in no distress, oriented to person, place, and time and overweight    Patient Care Team:  Janelle Rendon MD as PCP - General (Internal Medicine Physician)  Janelle Rendon MD as PCP - [facility] Empaneled Provider  Araceli Weston MD (Gastroenterology)  Palmer Doran MD (Cardiovascular Disease Physician)  Marshall Fiore MD (Ophthalmology)    End-of-life planning  Advanced Directive in the case than an injury or illness causes the patient to be unable to make health care decisions    Health Care Directive or Living Will: yes    Advice/Referrals/Counselling/Plan:   Education and counseling provided:  Are appropriate based on today's review and evaluation  Pneumococcal Vaccine  Prostate cancer screening tests (PSA, covered annually)  Bone mass measurement (DEXA)  Screening for glaucoma  Include in education list (weight loss, physical activity, smoking cessation, fall prevention, and nutrition)    ICD-10-CM ICD-9-CM    1. Medicare annual wellness visit, subsequent  R55.35 K19.7 METABOLIC PANEL, COMPREHENSIVE      LIPID PANEL      CBC WITH AUTOMATED DIFF   2. Severe obesity (BMI 35.0-39. 9) with comorbidity ([facility]  E66.01 278.01 HEMOGLOBIN A1C W/O EAG   3. Essential hypertension  F54 856.7 METABOLIC PANEL, COMPREHENSIVE      LIPID PANEL      CBC WITH AUTOMATED DIFF      HEMOGLOBIN A1C W/O EAG      lisinopriL (PRINIVIL, ZESTRIL) 10 mg tablet      carvediloL (COREG) 6.25 mg tablet      spironolactone (ALDACTONE) 25 mg tablet      aspirin delayed-release 81 mg tablet   4. Coronary artery disease involving native coronary artery of native heart without angina pectoris  I25.10 414.01 LIPID PANEL      atorvastatin (LIPITOR) 80 mg tablet   5. Pure hypercholesterolemia  E78.00 272.0 atorvastatin (LIPITOR) 80 mg tablet   6. Ischemic cardiomyopathy  I25.5 414.8 HEMOGLOBIN A1C W/O EAG      furosemide (LASIX) 40 mg tablet   7. Encounter for immunization  Z23 V03.89 PNEUMOCOCCAL, PCV20, PREVNAR 20, (AGE 18 YRS+), IM, PF   8. Abnormal finding of blood chemistry, unspecified   R79.9 790.6 HEMOGLOBIN A1C W/O EAG     Encounter Diagnoses   Name Primary?  Medicare annual wellness visit, subsequent Yes    Severe obesity (BMI 35.0-39. 9) with comorbidity ([facility]     Essential hypertension     Coronary artery disease involving native coronary artery of native heart without angina pectoris     Pure hypercholesterolemia     Ischemic cardiomyopathy     Encounter for immunization     Abnormal finding of blood chemistry, unspecified       Orders Placed This Encounter    PNEUMOCOCCAL, PCV20, PREVNAR 20, (AGE 18 YRS+), IM, PF    METABOLIC PANEL, COMPREHENSIVE    LIPID PANEL    CBC WITH AUTOMATED DIFF    HEMOGLOBIN A1C W/O EAG    nitroglycerin (Nitrostat) 0.4 mg SL tablet    lisinopriL (PRINIVIL, ZESTRIL) 10 mg tablet    atorvastatin (LIPITOR) 80 mg tablet    carvediloL (COREG) 6.25 mg tablet    spironolactone (ALDACTONE) 25 mg tablet    furosemide (LASIX) 40 mg tablet    aspirin delayed-release 81 mg tablet     Orders Placed This Encounter    PNEUMOCOCCAL, PCV20, PREVNAR 20, (AGE 18 YRS+), IM, PF    METABOLIC PANEL, COMPREHENSIVE     Standing Status:   Future     Standing Expiration Date:   7/19/2023    LIPID PANEL     Standing Status:   Future     Standing Expiration Date:   7/19/2023    CBC WITH AUTOMATED DIFF     Standing Status:   Future     Standing Expiration Date:   7/19/2023    HEMOGLOBIN A1C W/O EAG     Standing Status:   Future     Standing Expiration Date:   7/19/2023    lisinopriL (PRINIVIL, ZESTRIL) 10 mg tablet     Sig: Take 1 Tablet by mouth daily. Dispense:  90 Tablet     Refill:  1    atorvastatin (LIPITOR) 80 mg tablet     Sig: Take 1 Tablet by mouth daily. Dispense:  90 Tablet     Refill:  3    carvediloL (COREG) 6.25 mg tablet     Sig: Take 1 Tablet by mouth two (2) times daily (with meals). Dispense:  180 Tablet     Refill:  1    spironolactone (ALDACTONE) 25 mg tablet     Sig: Take 1 Tablet by mouth daily. Dispense:  90 Tablet     Refill:  3    furosemide (LASIX) 40 mg tablet     Sig: Take 1 Tablet by mouth daily. Dispense:  90 Tablet     Refill:  3    aspirin delayed-release 81 mg tablet     Sig: Take 1 Tablet by mouth daily.      Dispense:  90 Tablet     Refill:  3     Orders Placed This Encounter    PNEUMOCOCCAL, PCV20, PREVNAR 20, (AGE 18 YRS+), IM, PF    METABOLIC PANEL, COMPREHENSIVE    LIPID PANEL    CBC WITH AUTOMATED DIFF    HEMOGLOBIN A1C W/O EAG    lisinopriL (PRINIVIL, ZESTRIL) 10 mg tablet    atorvastatin (LIPITOR) 80 mg tablet    carvediloL (COREG) 6.25 mg tablet    spironolactone (ALDACTONE) 25 mg tablet    furosemide (LASIX) 40 mg tablet    aspirin delayed-release 81 mg tablet     Diagnoses and all orders for this visit:    1. Medicare annual wellness visit, subsequent  -     METABOLIC PANEL, COMPREHENSIVE; Future  -     LIPID PANEL; Future  -     CBC WITH AUTOMATED DIFF; Future    2. Severe obesity (BMI 35.0-39. 9) with comorbidity ([facility]  -     HEMOGLOBIN A1C W/O EAG; Future    3. Essential hypertension  Blood pressures well controlled on current medications at  -     METABOLIC PANEL, COMPREHENSIVE; Future  -     LIPID PANEL; Future  -     CBC WITH AUTOMATED DIFF; Future  -     HEMOGLOBIN A1C W/O EAG; Future  -     lisinopriL (PRINIVIL, ZESTRIL) 10 mg tablet; Take 1 Tablet by mouth daily. -     carvediloL (COREG) 6.25 mg tablet; Take 1 Tablet by mouth two (2) times daily (with meals). -     spironolactone (ALDACTONE) 25 mg tablet; Take 1 Tablet by mouth daily. -     aspirin delayed-release 81 mg tablet; Take 1 Tablet by mouth daily. 4. Coronary artery disease involving native coronary artery of native heart without angina pectoris  -     LIPID PANEL; Future  -     atorvastatin (LIPITOR) 80 mg tablet; Take 1 Tablet by mouth daily. 5. Pure hypercholesterolemia  He is adherent to atorvastatin  -     atorvastatin (LIPITOR) 80 mg tablet; Take 1 Tablet by mouth daily. 6. Ischemic cardiomyopathy  Stable following up with cardiologist  -     HEMOGLOBIN A1C W/O EAG; Future  -     furosemide (LASIX) 40 mg tablet; Take 1 Tablet by mouth daily. 7. Encounter for immunization  -     PNEUMOCOCCAL, PCV20, PREVNAR 20, (AGE 18 YRS+), IM, PF    8. Abnormal finding of blood chemistry, unspecified   -     HEMOGLOBIN A1C W/O EAG; Future      Follow-up and Dispositions    · Return in about 6 months (around 1/18/2023).        current treatment plan is effective, no change in therapy. Brief written plan, checklist    I have discussed the diagnosis with the patient and the intended plan as seen in the above orders. The patient has received an after-visit summary and questions were answered concerning future plans. I have discussed medication side effects and warnings with the patient as well. I have reviewed the plan of care with the patient, accepted their input and they are in agreement with the treatment goals. Follow-up and Dispositions    · Return in about 6 months (around 1/18/2023).            ____________________________________________________________    Problem Assessment    for treatment of   Chief Complaint   Patient presents with   [facility]

## 2022-07-19 ENCOUNTER — HOSPITAL ENCOUNTER (OUTPATIENT)
Dept: LAB | Age: 67
Discharge: HOME | End: 2022-07-19
Payer: MEDICARE

## 2022-07-19 ENCOUNTER — APPOINTMENT (OUTPATIENT)
Dept: FAMILY MEDICINE CLINIC | Age: 67
End: 2022-07-19

## 2022-07-19 DIAGNOSIS — I25.10 CORONARY ARTERY DISEASE INVOLVING NATIVE CORONARY ARTERY OF NATIVE HEART WITHOUT ANGINA PECTORIS: ICD-10-CM

## 2022-07-19 DIAGNOSIS — I25.5 ISCHEMIC CARDIOMYOPATHY: ICD-10-CM

## 2022-07-19 DIAGNOSIS — Z00.00 MEDICARE ANNUAL WELLNESS VISIT, SUBSEQUENT: ICD-10-CM

## 2022-07-19 DIAGNOSIS — I10 ESSENTIAL HYPERTENSION: ICD-10-CM

## 2022-07-19 DIAGNOSIS — R79.9 ABNORMAL FINDING OF BLOOD CHEMISTRY, UNSPECIFIED: ICD-10-CM

## 2022-07-19 DIAGNOSIS — E66.01 SEVERE OBESITY (BMI 35.0-39.9) WITH COMORBIDITY (HCC): ICD-10-CM

## 2022-07-19 LAB
ALBUMIN SERPL-MCNC: 3.7 G/DL (ref 3.4–5)
ALBUMIN/GLOB SERPL: 1.3 {RATIO} (ref 0.8–1.7)
ALP SERPL-CCNC: 97 U/L (ref 45–117)
ALT SERPL-CCNC: 38 U/L (ref 16–61)
ANION GAP SERPL CALC-SCNC: 6 MMOL/L (ref 3–18)
AST SERPL-CCNC: 26 U/L (ref 10–38)
BASOPHILS # BLD: 0.1 K/UL (ref 0–0.1)
BASOPHILS NFR BLD: 1 % (ref 0–2)
BILIRUB SERPL-MCNC: 0.7 MG/DL (ref 0.2–1)
BUN SERPL-MCNC: 29 MG/DL (ref 7–18)
BUN/CREAT SERPL: 27 (ref 12–20)
CALCIUM SERPL-MCNC: 8.7 MG/DL (ref 8.5–10.1)
CHLORIDE SERPL-SCNC: 108 MMOL/L (ref 100–111)
CHOLEST SERPL-MCNC: 138 MG/DL
CO2 SERPL-SCNC: 25 MMOL/L (ref 21–32)
CREAT SERPL-MCNC: 1.06 MG/DL (ref 0.6–1.3)
DIFFERENTIAL METHOD BLD: ABNORMAL
EOSINOPHIL # BLD: 0.3 K/UL (ref 0–0.4)
EOSINOPHIL NFR BLD: 3 % (ref 0–5)
ERYTHROCYTE [DISTWIDTH] IN BLOOD BY AUTOMATED COUNT: 13.2 % (ref 11.6–14.5)
GLOBULIN SER CALC-MCNC: 2.9 G/DL (ref 2–4)
GLUCOSE SERPL-MCNC: 87 MG/DL (ref 74–99)
HBA1C MFR BLD: 5.6 % (ref 4.2–5.6)
HCT VFR BLD AUTO: 47.7 % (ref 36–48)
HDLC SERPL-MCNC: 42 MG/DL (ref 40–60)
HDLC SERPL: 3.3 {RATIO} (ref 0–5)
HGB BLD-MCNC: 15.9 G/DL (ref 13–16)
IMM GRANULOCYTES # BLD AUTO: 0 K/UL (ref 0–0.04)
IMM GRANULOCYTES NFR BLD AUTO: 0 % (ref 0–0.5)
LDLC SERPL CALC-MCNC: 69.4 MG/DL (ref 0–100)
LIPID PROFILE,FLP: NORMAL
LYMPHOCYTES # BLD: 1.1 K/UL (ref 0.9–3.6)
LYMPHOCYTES NFR BLD: 14 % (ref 21–52)
MCH RBC QN AUTO: 31.7 PG (ref 24–34)
MCHC RBC AUTO-ENTMCNC: 33.3 G/DL (ref 31–37)
MCV RBC AUTO: 95.2 FL (ref 78–100)
MONOCYTES # BLD: 0.9 K/UL (ref 0.05–1.2)
MONOCYTES NFR BLD: 11 % (ref 3–10)
NEUTS SEG # BLD: 6 K/UL (ref 1.8–8)
NEUTS SEG NFR BLD: 71 % (ref 40–73)
NRBC # BLD: 0 K/UL (ref 0–0.01)
NRBC BLD-RTO: 0 PER 100 WBC
PLATELET # BLD AUTO: 250 K/UL (ref 135–420)
PMV BLD AUTO: 9.3 FL (ref 9.2–11.8)
POTASSIUM SERPL-SCNC: 4.3 MMOL/L (ref 3.5–5.5)
PROT SERPL-MCNC: 6.6 G/DL (ref 6.4–8.2)
RBC # BLD AUTO: 5.01 M/UL (ref 4.35–5.65)
SODIUM SERPL-SCNC: 139 MMOL/L (ref 136–145)
TRIGL SERPL-MCNC: 133 MG/DL (ref ?–150)
VLDLC SERPL CALC-MCNC: 26.6 MG/DL
WBC # BLD AUTO: 8.4 K/UL (ref 4.6–13.2)

## 2022-07-19 PROCEDURE — 80061 LIPID PANEL: CPT

## 2022-07-19 PROCEDURE — 80053 COMPREHEN METABOLIC PANEL: CPT

## 2022-07-19 PROCEDURE — 36415 COLL VENOUS BLD VENIPUNCTURE: CPT

## 2022-07-19 PROCEDURE — 83036 HEMOGLOBIN GLYCOSYLATED A1C: CPT

## 2022-07-19 PROCEDURE — 85025 COMPLETE CBC W/AUTO DIFF WBC: CPT

## 2022-11-09 ENCOUNTER — OFFICE VISIT (OUTPATIENT)
Dept: FAMILY MEDICINE CLINIC | Age: 67
End: 2022-11-09
Payer: MEDICARE

## 2022-11-09 VITALS
HEIGHT: 65 IN | HEART RATE: 56 BPM | TEMPERATURE: 97.7 F | RESPIRATION RATE: 18 BRPM | BODY MASS INDEX: 36.49 KG/M2 | SYSTOLIC BLOOD PRESSURE: 118 MMHG | WEIGHT: 219 LBS | DIASTOLIC BLOOD PRESSURE: 72 MMHG | OXYGEN SATURATION: 98 %

## 2022-11-09 DIAGNOSIS — M54.50 ACUTE LEFT-SIDED LOW BACK PAIN WITHOUT SCIATICA: Primary | ICD-10-CM

## 2022-11-09 PROCEDURE — G8536 NO DOC ELDER MAL SCRN: HCPCS | Performed by: INTERNAL MEDICINE

## 2022-11-09 PROCEDURE — G8432 DEP SCR NOT DOC, RNG: HCPCS | Performed by: INTERNAL MEDICINE

## 2022-11-09 PROCEDURE — 3017F COLORECTAL CA SCREEN DOC REV: CPT | Performed by: INTERNAL MEDICINE

## 2022-11-09 PROCEDURE — 1101F PT FALLS ASSESS-DOCD LE1/YR: CPT | Performed by: INTERNAL MEDICINE

## 2022-11-09 PROCEDURE — 1123F ACP DISCUSS/DSCN MKR DOCD: CPT | Performed by: INTERNAL MEDICINE

## 2022-11-09 PROCEDURE — 99213 OFFICE O/P EST LOW 20 MIN: CPT | Performed by: INTERNAL MEDICINE

## 2022-11-09 PROCEDURE — G8417 CALC BMI ABV UP PARAM F/U: HCPCS | Performed by: INTERNAL MEDICINE

## 2022-11-09 PROCEDURE — G8427 DOCREV CUR MEDS BY ELIG CLIN: HCPCS | Performed by: INTERNAL MEDICINE

## 2022-11-09 RX ORDER — PREDNISONE 10 MG/1
TABLET ORAL
Qty: 21 TABLET | Refills: 0 | Status: SHIPPED | OUTPATIENT
Start: 2022-11-09

## 2022-11-09 RX ORDER — METHOCARBAMOL 500 MG/1
500 TABLET, FILM COATED ORAL
Qty: 40 TABLET | Refills: 0 | Status: SHIPPED | OUTPATIENT
Start: 2022-11-09 | End: 2022-11-24

## 2022-11-09 NOTE — PROGRESS NOTES
HISTORY OF PRESENT ILLNESS  Helen Guerrero is a 75 y.o. male. HPI  C/o low back pain, left side, started 2 weeks ago, constant, 6/10, worse with activity, no radiation to the legs,no recent fall or trauma  No bladder/bowel incontinence  No legs weakness or numbness  Review of Systems   Constitutional:  Negative for fever. Genitourinary:  Negative for dysuria and hematuria. Musculoskeletal:  Negative for falls. Neurological:  Negative for focal weakness. Physical Exam  Vitals reviewed. Constitutional:       General: He is not in acute distress. Musculoskeletal:      Lumbar back: Spasms and tenderness (left paraspinal.) present. No swelling or bony tenderness. Normal range of motion. Negative left straight leg raise test.       ASSESSMENT and PLAN  Diagnoses and all orders for this visit:    1. Acute left-sided low back pain without sciatica  -     methocarbamoL (ROBAXIN) 500 mg tablet; Take 1 Tablet by mouth three (3) times daily as needed for Muscle Spasm(s) for up to 15 days. -     predniSONE (STERAPRED DS) 10 mg dose pack; See administration instruction per 10mg dose pack  -     XR SPINE LUMB MIN 4 V; Future  - home exercises given today      Follow-up and Dispositions    Return if symptoms worsen or fail to improve.

## 2022-11-09 NOTE — PROGRESS NOTES
Viri Abreu is a 75 y.o. male (: 1955) presenting to address:    Chief Complaint   Patient presents with    Back Pain       Vitals:    22 1450   BP: 118/72   Pulse: (!) 56   Resp: 18   Temp: 97.7 °F (36.5 °C)   TempSrc: Temporal   SpO2: 98%   Weight: 219 lb (99.3 kg)   Height: 5' 5\" (1.651 m)   PainSc:   6       Hearing/Vision:   No results found. Learning Assessment:     Learning Assessment 2019   PRIMARY LEARNER Patient   HIGHEST LEVEL OF EDUCATION - PRIMARY LEARNER  2 YEARS OF COLLEGE   BARRIERS PRIMARY LEARNER NONE   CO-LEARNER CAREGIVER No   PRIMARY LANGUAGE ENGLISH    NEED No   LEARNER PREFERENCE PRIMARY READING   LEARNING SPECIAL TOPICS none   ANSWERED BY patient   RELATIONSHIP SELF   ASSESSMENT COMMENT n/a     Depression Screening:     3 most recent PHQ Screens 2022   PHQ Not Done -   Little interest or pleasure in doing things Not at all   Feeling down, depressed, irritable, or hopeless Not at all   Total Score PHQ 2 0     Fall Risk Assessment:     Fall Risk Assessment, last 12 mths 2022   Able to walk? Yes   Fall in past 12 months? 0   Do you feel unsteady? -   Are you worried about falling -   Is the gait abnormal? -   Number of falls in past 12 months -   Fall with injury? -     Abuse Screening:     Abuse Screening Questionnaire 2022   Do you ever feel afraid of your partner? N   Are you in a relationship with someone who physically or mentally threatens you? N   Is it safe for you to go home?  Y     ADL Assessment:     ADL Assessment 2022   Feeding yourself No Help Needed   Getting from bed to chair No Help Needed   Getting dressed No Help Needed   Bathing or showering No Help Needed   Walk across the room (includes cane/walker) No Help Needed   Using the telphone No Help Needed   Taking your medications No Help Needed   Preparing meals No Help Needed   Managing money (expenses/bills) No Help Needed   Moderately strenuous housework (laundry) No Help Needed   Shopping for personal items (toiletries/medicines) No Help Needed   Shopping for groceries No Help Needed   Driving No Help Needed   Climbing a flight of stairs No Help Needed   Getting to places beyond walking distances No Help Needed        Coordination of Care Questionaire:   1. \"Have you been to the ER, urgent care clinic since your last visit? Hospitalized since your last visit? \" No    2. \"Have you seen or consulted any other health care providers outside of the [address] [place] [address] [facility] since your last visit? \" Yes Where: Cardiologist August 9th      3. For patients aged 39-70: Has the patient had a colonoscopy? Yes - no Care Gap present     If the patient is female:    4. For patients aged 41-77: Has the patient had a mammogram within the past 2 years? NA - based on age    11. For patients aged 21-65: Has the patient had a pap smear? NA - based on age    Advanced Directive:   1. Do you have an Advanced Directive? YES    2. Would you like information on Advanced Directives?  NO

## 2022-12-12 ENCOUNTER — OFFICE VISIT (OUTPATIENT)
Dept: FAMILY MEDICINE CLINIC | Age: 67
End: 2022-12-12
Payer: MEDICARE

## 2022-12-12 VITALS
SYSTOLIC BLOOD PRESSURE: 114 MMHG | HEIGHT: 65 IN | BODY MASS INDEX: 36.99 KG/M2 | RESPIRATION RATE: 12 BRPM | TEMPERATURE: 98 F | OXYGEN SATURATION: 98 % | HEART RATE: 63 BPM | WEIGHT: 222 LBS | DIASTOLIC BLOOD PRESSURE: 72 MMHG

## 2022-12-12 DIAGNOSIS — M54.6 ACUTE MIDLINE THORACIC BACK PAIN: Primary | ICD-10-CM

## 2022-12-12 DIAGNOSIS — I25.10 CORONARY ARTERY DISEASE INVOLVING NATIVE CORONARY ARTERY OF NATIVE HEART WITHOUT ANGINA PECTORIS: ICD-10-CM

## 2022-12-12 DIAGNOSIS — I10 ESSENTIAL HYPERTENSION: ICD-10-CM

## 2022-12-12 PROBLEM — E66.01 CLASS 2 SEVERE OBESITY DUE TO EXCESS CALORIES WITH SERIOUS COMORBIDITY AND BODY MASS INDEX (BMI) OF 37.0 TO 37.9 IN ADULT (HCC): Status: RESOLVED | Noted: 2019-08-20 | Resolved: 2022-12-12

## 2022-12-12 PROCEDURE — 3017F COLORECTAL CA SCREEN DOC REV: CPT | Performed by: LEGAL MEDICINE

## 2022-12-12 PROCEDURE — G8427 DOCREV CUR MEDS BY ELIG CLIN: HCPCS | Performed by: LEGAL MEDICINE

## 2022-12-12 PROCEDURE — 1101F PT FALLS ASSESS-DOCD LE1/YR: CPT | Performed by: LEGAL MEDICINE

## 2022-12-12 PROCEDURE — G8536 NO DOC ELDER MAL SCRN: HCPCS | Performed by: LEGAL MEDICINE

## 2022-12-12 PROCEDURE — G8417 CALC BMI ABV UP PARAM F/U: HCPCS | Performed by: LEGAL MEDICINE

## 2022-12-12 PROCEDURE — 1123F ACP DISCUSS/DSCN MKR DOCD: CPT | Performed by: LEGAL MEDICINE

## 2022-12-12 PROCEDURE — 99214 OFFICE O/P EST MOD 30 MIN: CPT | Performed by: LEGAL MEDICINE

## 2022-12-12 PROCEDURE — 3074F SYST BP LT 130 MM HG: CPT | Performed by: LEGAL MEDICINE

## 2022-12-12 PROCEDURE — 3078F DIAST BP <80 MM HG: CPT | Performed by: LEGAL MEDICINE

## 2022-12-12 PROCEDURE — G8510 SCR DEP NEG, NO PLAN REQD: HCPCS | Performed by: LEGAL MEDICINE

## 2022-12-12 RX ORDER — PREDNISOLONE ACETATE 10 MG/ML
SUSPENSION/ DROPS OPHTHALMIC
COMMUNITY
Start: 2022-10-17

## 2022-12-12 RX ORDER — METHOCARBAMOL 500 MG/1
500 TABLET, FILM COATED ORAL
Qty: 40 TABLET | Refills: 0 | Status: SHIPPED | OUTPATIENT
Start: 2022-12-12 | End: 2022-12-22

## 2022-12-12 NOTE — PROGRESS NOTES
[facility]     Chief Complaint   Patient presents with    Back Pain     Started a month ago no known cause     Vitals:    12/12/22 1146   BP: 114/72   Pulse: 63   Resp: 12   Temp: 98 °F (36.7 °C)   TempSrc: Temporal   SpO2: 98%   Weight: 222 lb (100.7 kg)   Height: 5' 5\" (1.651 m)   PainSc:   4   PainLoc: Back         HPI: Low back pain ,as well as upper back pain, he also feels the pain on both sides of his chest  Pain intensity range from 4 to 6 , no numbness no tingling  He was seen byKelli Carrillo last month treated with muscle relaxant and prednisone, symptoms has improved but he has a recurrence of symptoms, patient is exercising frequently with limited weight lifting and chest press 160 pound        Had Xray of the lumbar spine below all the results    FINDINGS:      Normal alignment. The vertebral body heights are well-preserved. There is  multilevel disc space height loss most pronounced at L3-L4 and L4-L5. Mild  multilevel osteophytic endplate formation. There is no fracture, subluxation or  other acute abnormality. Lower lumbar facet hypertrophy with reactive sclerosis. Bilateral total hip prostheses. IMPRESSION     No acute finding, mild/moderate degenerative changes as described.          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 (HCC)     Cataract     Hypercholesterolemia     Hypertension     Ischemic cardiomyopathy      Past Surgical History:   Procedure Laterality Date    HX CORNEAL TRANSPLANT Right 2016    HX CORNEAL TRANSPLANT Left 2018    HX CORONARY STENT PLACEMENT  03/2015    HX HIP REPLACEMENT Bilateral 2011     Social History     Tobacco Use    Smoking status: Some Days     Types: Cigars    Smokeless tobacco: Never   Substance Use Topics    Alcohol use: Not Currently       Family History   Problem Relation Age of Onset    Heart Disease Father        Review of Systems   Constitutional:  Negative for chills, fever, malaise/fatigue and weight loss. HENT:  Negative for congestion, ear discharge, ear pain, hearing loss and nosebleeds. Eyes:  Negative for blurred vision, double vision and discharge. Respiratory:  Negative for cough. Cardiovascular:  Negative for chest pain, palpitations, claudication and leg swelling. Gastrointestinal:  Negative for abdominal pain, constipation, diarrhea, nausea and vomiting. Genitourinary:  Negative for dysuria, frequency and urgency. Musculoskeletal:  Positive for back pain and myalgias. Skin:  Negative for itching and rash. Neurological:  Negative for dizziness, tingling, sensory change, speech change, focal weakness, weakness and headaches. Physical Exam  Vitals and nursing note reviewed. Constitutional:       General: He is not in acute distress. Appearance: Normal appearance. He is well-developed. He is not ill-appearing, toxic-appearing or diaphoretic. HENT:      Head: Normocephalic and atraumatic. Eyes:      General: No scleral icterus. Right eye: No discharge. Left eye: No discharge. Conjunctiva/sclera: Conjunctivae normal.      Pupils: Pupils are equal, round, and reactive to light. Neck:      Thyroid: No thyromegaly. Cardiovascular:      Rate and Rhythm: Normal rate and regular rhythm. Heart sounds: Normal heart sounds. Pulmonary:      Effort: Pulmonary effort is normal. No respiratory distress. Breath sounds: Normal breath sounds. No rales. Abdominal:      General: Bowel sounds are normal. There is no distension. Palpations: Abdomen is soft. There is no mass. Tenderness: There is no abdominal tenderness. There is no rebound. Musculoskeletal:         General: No tenderness or deformity. Normal range of motion. Cervical back: Normal range of motion. No rigidity or tenderness. Right lower leg: No edema. Left lower leg: No edema.       Comments: Patient is able to bend his back without pain also able to squat with no pain   Lymphadenopathy:      Cervical: No cervical adenopathy. Skin:     General: Skin is warm and dry. Findings: No erythema or rash. Neurological:      Mental Status: He is alert and oriented to person, place, and time. Cranial Nerves: No cranial nerve deficit. Coordination: Coordination normal.      Gait: Gait normal.   Psychiatric:         Mood and Affect: Mood normal.         Behavior: Behavior normal.         Thought Content: Thought content normal.         Judgment: Judgment normal.        Assessment and plan     Plan of care has been discussed with the patient, he agrees to the plan and verbalized understanding. All his questions were answered  More than 50% of the time spent in this visit was counseling the patient about  illness and treatment options         1. Acute midline thoracic back pain  Advised patient about the importance of staying well-hydrated, regular stretching    - REFERRAL TO PHYSICAL THERAPY  - methocarbamoL (ROBAXIN) 500 mg tablet; Take 1 Tablet by mouth three (3) times daily as needed for Muscle Spasm(s) for up to 10 days. Dispense: 40 Tablet; Refill: 0    2. Essential hypertension  Blood pressures well controlled on current medications    3. Coronary artery disease involving native coronary artery of native heart without angina pectoris  Stable with no acute exacerbationsHe does follow-up with cardiology  Current Outpatient Medications   Medication Sig Dispense Refill    prednisoLONE acetate (PRED FORTE) 1 % ophthalmic suspension       methocarbamoL (ROBAXIN) 500 mg tablet Take 1 Tablet by mouth three (3) times daily as needed for Muscle Spasm(s) for up to 10 days. 40 Tablet 0    lisinopriL (PRINIVIL, ZESTRIL) 10 mg tablet Take 1 Tablet by mouth daily. 90 Tablet 1    atorvastatin (LIPITOR) 80 mg tablet Take 1 Tablet by mouth daily.  90 Tablet 3    carvediloL (COREG) 6.25 mg tablet Take 1 Tablet by mouth two (2) times daily (with meals). 180 Tablet 1    spironolactone (ALDACTONE) 25 mg tablet Take 1 Tablet by mouth daily. 90 Tablet 3    furosemide (LASIX) 40 mg tablet Take 1 Tablet by mouth daily. 90 Tablet 3    aspirin delayed-release 81 mg tablet Take 1 Tablet by mouth daily. 90 Tablet 3    brimonidine-timoloL (COMBIGAN) 0.2-0.5 % drop ophthalmic solution Apply 1 Drop to eye daily. difluprednate (DUREZOL) 0.05 % ophthalmic emulsion Apply 1 Drop to eye daily. Patient Active Problem List    Diagnosis Date Noted    Coronary artery disease involving native coronary artery of native heart without angina pectoris 02/27/2019    Ischemic cardiomyopathy 02/27/2019     Results for orders placed or performed during the hospital encounter of 21/55/01   METABOLIC PANEL, COMPREHENSIVE   Result Value Ref Range    Sodium 139 136 - 145 mmol/L    Potassium 4.3 3.5 - 5.5 mmol/L    Chloride 108 100 - 111 mmol/L    CO2 25 21 - 32 mmol/L    Anion gap 6 3.0 - 18 mmol/L    Glucose 87 74 - 99 mg/dL    BUN 29 (H) 7.0 - 18 MG/DL    Creatinine 1.06 0.6 - 1.3 MG/DL    BUN/Creatinine ratio 27 (H) 12 - 20      GFR est AA >60 >60 ml/min/1.73m2    GFR est non-AA >60 >60 ml/min/1.73m2    Calcium 8.7 8.5 - 10.1 MG/DL    Bilirubin, total 0.7 0.2 - 1.0 MG/DL    ALT (SGPT) 38 16 - 61 U/L    AST (SGOT) 26 10 - 38 U/L    Alk.  phosphatase 97 45 - 117 U/L    Protein, total 6.6 6.4 - 8.2 g/dL    Albumin 3.7 3.4 - 5.0 g/dL    Globulin 2.9 2.0 - 4.0 g/dL    A-G Ratio 1.3 0.8 - 1.7     LIPID PANEL   Result Value Ref Range    LIPID PROFILE          Cholesterol, total 138 <200 MG/DL    Triglyceride 133 <150 MG/DL    HDL Cholesterol 42 40 - 60 MG/DL    LDL, calculated 69.4 0 - 100 MG/DL    VLDL, calculated 26.6 MG/DL    CHOL/HDL Ratio 3.3 0 - 5.0     CBC WITH AUTOMATED DIFF   Result Value Ref Range    WBC 8.4 4.6 - 13.2 K/uL    RBC 5.01 4.35 - 5.65 M/uL    HGB 15.9 13.0 - 16.0 g/dL    HCT 47.7 36.0 - 48.0 %    MCV 95.2 78.0 - 100.0 FL    MCH 31.7 24.0 - 34.0 PG    MCHC 33.3 31.0 - 37.0 g/dL    RDW 13.2 11.6 - 14.5 %    PLATELET 582 024 - 705 K/uL    MPV 9.3 9.2 - 11.8 FL    NRBC 0.0 0  WBC    ABSOLUTE NRBC 0.00 0.00 - 0.01 K/uL    NEUTROPHILS 71 40 - 73 %    LYMPHOCYTES 14 (L) 21 - 52 %    MONOCYTES 11 (H) 3 - 10 %    EOSINOPHILS 3 0 - 5 %    BASOPHILS 1 0 - 2 %    IMMATURE GRANULOCYTES 0 0.0 - 0.5 %    ABS. NEUTROPHILS 6.0 1.8 - 8.0 K/UL    ABS. LYMPHOCYTES 1.1 0.9 - 3.6 K/UL    ABS. MONOCYTES 0.9 0.05 - 1.2 K/UL    ABS. EOSINOPHILS 0.3 0.0 - 0.4 K/UL    ABS. BASOPHILS 0.1 0.0 - 0.1 K/UL    ABS. IMM. GRANS. 0.0 0.00 - 0.04 K/UL    DF AUTOMATED     HEMOGLOBIN A1C W/O EAG   Result Value Ref Range    Hemoglobin A1c 5.6 4.2 - 5.6 %     No visits with results within 3 Month(s) from this visit. Latest known visit with results is:   Hospital Outpatient Visit on 07/19/2022   Component Date Value Ref Range Status    Sodium 07/19/2022 139  136 - 145 mmol/L Final    Potassium 07/19/2022 4.3  3.5 - 5.5 mmol/L Final    Chloride 07/19/2022 108  100 - 111 mmol/L Final    CO2 07/19/2022 25  21 - 32 mmol/L Final    Anion gap 07/19/2022 6  3.0 - 18 mmol/L Final    Glucose 07/19/2022 87  74 - 99 mg/dL Final    BUN 07/19/2022 29 (A)  7.0 - 18 MG/DL Final    Creatinine 07/19/2022 1.06  0.6 - 1.3 MG/DL Final    BUN/Creatinine ratio 07/19/2022 27 (A)  12 - 20   Final    GFR est AA 07/19/2022 >60  >60 ml/min/1.73m2 Final    GFR est non-AA 07/19/2022 >60  >60 ml/min/1.73m2 Final    Comment: (NOTE)  Estimated GFR is calculated using the Modification of Diet in Renal   Disease (MDRD) Study equation, reported for both  Americans   (GFRAA) and non- Americans (GFRNA), and normalized to 1.73m2   body surface area. The physician must decide which value applies to   the patient. The MDRD study equation should only be used in   individuals age 25 or older.  It has not been validated for the   following: pregnant women, patients with serious comorbid conditions,   or on certain medications, or persons with extremes of body size,   muscle mass, or nutritional status. Calcium 07/19/2022 8.7  8.5 - 10.1 MG/DL Final    Bilirubin, total 07/19/2022 0.7  0.2 - 1.0 MG/DL Final    ALT (SGPT) 07/19/2022 38  16 - 61 U/L Final    AST (SGOT) 07/19/2022 26  10 - 38 U/L Final    Alk. phosphatase 07/19/2022 97  45 - 117 U/L Final    Protein, total 07/19/2022 6.6  6.4 - 8.2 g/dL Final    Albumin 07/19/2022 3.7  3.4 - 5.0 g/dL Final    Globulin 07/19/2022 2.9  2.0 - 4.0 g/dL Final    A-G Ratio 07/19/2022 1.3  0.8 - 1.7   Final    LIPID PROFILE 07/19/2022        Final    Cholesterol, total 07/19/2022 138  <200 MG/DL Final    Triglyceride 07/19/2022 133  <150 MG/DL Final    Comment: The drugs N-acetylcysteine (NAC) and  Metamiszole have been found to cause falsely  low results in this chemical assay. Please  be sure to submit blood samples obtained  BEFORE administration of either of these  drugs to assure correct results.       HDL Cholesterol 07/19/2022 42  40 - 60 MG/DL Final    LDL, calculated 07/19/2022 69.4  0 - 100 MG/DL Final    VLDL, calculated 07/19/2022 26.6  MG/DL Final    CHOL/HDL Ratio 07/19/2022 3.3  0 - 5.0   Final    WBC 07/19/2022 8.4  4.6 - 13.2 K/uL Final    RBC 07/19/2022 5.01  4.35 - 5.65 M/uL Final    HGB 07/19/2022 15.9  13.0 - 16.0 g/dL Final    HCT 07/19/2022 47.7  36.0 - 48.0 % Final    MCV 07/19/2022 95.2  78.0 - 100.0 FL Final    MCH 07/19/2022 31.7  24.0 - 34.0 PG Final    MCHC 07/19/2022 33.3  31.0 - 37.0 g/dL Final    RDW 07/19/2022 13.2  11.6 - 14.5 % Final    PLATELET 91/44/3464 639  135 - 420 K/uL Final    MPV 07/19/2022 9.3  9.2 - 11.8 FL Final    NRBC 07/19/2022 0.0  0  WBC Final    ABSOLUTE NRBC 07/19/2022 0.00  0.00 - 0.01 K/uL Final    NEUTROPHILS 07/19/2022 71  40 - 73 % Final    LYMPHOCYTES 07/19/2022 14 (A)  21 - 52 % Final    MONOCYTES 07/19/2022 11 (A)  3 - 10 % Final    EOSINOPHILS 07/19/2022 3  0 - 5 % Final    BASOPHILS 07/19/2022 1  0 - 2 % Final    IMMATURE GRANULOCYTES 07/19/2022 0  0.0 - 0.5 % Final    ABS. NEUTROPHILS 07/19/2022 6.0  1.8 - 8.0 K/UL Final    ABS. LYMPHOCYTES 07/19/2022 1.1  0.9 - 3.6 K/UL Final    ABS. MONOCYTES 07/19/2022 0.9  0.05 - 1.2 K/UL Final    ABS. EOSINOPHILS 07/19/2022 0.3  0.0 - 0.4 K/UL Final    ABS. BASOPHILS 07/19/2022 0.1  0.0 - 0.1 K/UL Final    ABS. IMM.  GRANS. 07/19/2022 0.0  0.00 - 0.04 K/UL Final    DF 07/19/2022 AUTOMATED    Final    Hemoglobin A1c 07/19/2022 5.6  4.2 - 5.6 % Final    Comment: (NOTE)  HbA1C Interpretive Ranges  <5.7              Normal  5.7 - 6.4         Consider Prediabetes  >6.5              Consider Diabetes

## 2022-12-12 NOTE — PROGRESS NOTES
Addison Becerra presents today for   Chief Complaint   Patient presents with    Back Pain     Started a month ago no known cause       Vitals:    12/12/22 1146   BP: 114/72   Pulse: 63   Resp: 12   Temp: 98 °F (36.7 °C)   TempSrc: Temporal   SpO2: 98%   Weight: 222 lb (100.7 kg)   Height: 5' 5\" (1.651 m)        Is someone accompanying this pt? no    Is the patient using any DME equipment during OV? no    Depression Screening:  3 most recent PHQ Screens 12/12/2022   PHQ Not Done -   Little interest or pleasure in doing things Not at all   Feeling down, depressed, irritable, or hopeless Not at all   Total Score PHQ 2 0       Learning Assessment:  Learning Assessment 2/27/2019   PRIMARY LEARNER Patient   HIGHEST LEVEL OF EDUCATION - PRIMARY LEARNER  2 YEARS OF COLLEGE   BARRIERS PRIMARY LEARNER NONE   CO-LEARNER CAREGIVER No   PRIMARY LANGUAGE ENGLISH    NEED No   LEARNER PREFERENCE PRIMARY READING   LEARNING SPECIAL TOPICS none   ANSWERED BY patient   RELATIONSHIP SELF   ASSESSMENT COMMENT n/a       Abuse Screening:  Abuse Screening Questionnaire 7/18/2022   Do you ever feel afraid of your partner? N   Are you in a relationship with someone who physically or mentally threatens you? N   Is it safe for you to go home? Y       Fall Screening  Fall Risk Assessment, last 12 mths 11/9/2022   Able to walk? Yes   Fall in past 12 months? 0   Do you feel unsteady? -   Are you worried about falling -   Is the gait abnormal? -   Number of falls in past 12 months -   Fall with injury? -       Generalized Anxiety  No flowsheet data found. Health Maintenance Due   Topic Date Due    COVID-19 Vaccine (5 - Booster for Pfizer series) 07/13/2022    Flu Vaccine (1) 08/01/2022   . Health Maintenance reviewed and discussed and ordered per Provider. Vaccines Due   Screenings Due       Addison Becerra is updated on all HM    1. \"Have you been to the ER, urgent care clinic since your last visit?   Hospitalized since your last visit? \" No    2. \"Have you seen or consulted any other health care providers outside of the [address] [place] [address] since your last visit? \" No     3. For patients aged 39-70: Has the patient had a colonoscopy / FIT/ Cologuard? Yes - Care Gap present. Most recent result on file     If the patient is female:    4. For patients aged 41-77: Has the patient had a mammogram within the past 2 years? NA - based on age    11. For patients aged 21-65: Has the patient had a pap smear?  NA - based on age

## 2022-12-16 ENCOUNTER — HOSPITAL ENCOUNTER (OUTPATIENT)
Dept: PHYSICAL THERAPY | Age: 67
Discharge: HOME | End: 2022-12-16
Payer: MEDICARE

## 2022-12-16 PROCEDURE — 97161 PT EVAL LOW COMPLEX 20 MIN: CPT

## 2022-12-16 PROCEDURE — 97535 SELF CARE MNGMENT TRAINING: CPT

## 2022-12-16 NOTE — THERAPY EVALUATION
[address], [address],[facility], [facility] - Phone: [phone]  Fax: [phone] OF CARE / [address] [employer]  Patient Name: Stanley Pan : 1955   Medical   Diagnosis: Other low back pain [M54.59] Treatment Diagnosis: Low back pain   Onset Date: 1.5 months     Referral Source: Amena Shay MD Start of Care [facility]): 2022   Prior Hospitalization: See medical history Provider #: [id number]   Prior Level of Function: IND   Comorbidities:  Atherosclerotic heart disease of native coronary artery with angina pectoris ([address]      Cataract      Hypercholesterolemia      Hypertension      Ischemic cardiomyopathy       HX CORNEAL TRANSPLANT Right     HX CORNEAL TRANSPLANT Left     HX CORONARY STENT PLACEMENT   2015    HX HIP REPLACEMENT Bilateral       Medications: Verified on Patient Summary List   The Plan of Care and following information is based on the information from the initial evaluation.   ===========================================================================================  Assessment / key information:  Stanley Pan is a 75 y.o. M who presents to skilled PT for the treatment diagnosis of low back pain. Denies any injury to the back. States that when he lays down, he feels it all the way in his upper back. When he sits up he feel it in the mid back. When he bumps into things, he can feel it I the midback and sometimes can radiate across the chest wall. Sharp and shooting. Denies N/T. He was having some low back pain at first but then now it is mostly in the upper middle back. Pain is located around T6-7 area. Was prescribed a steroid pack and some medication and it helped only slightly pain. No Xray for midback. Reports some limited shoulder ROM. Worst pain is laying down on the back.  Pt also reports frequent muscle spasm due to back pain    Pt is active and does Body pump about 3x per week, but then it started flaring up on him. Stopped for two weeks but no change in pain so went back to it. Pt has limited how much he lifts at the gym. Xray FINDINGS:     Normal alignment. The vertebral body heights are well-preserved. There is  multilevel disc space height loss most pronounced at L3-L4 and L4-L5. Mild  multilevel osteophytic endplate formation. There is no fracture, subluxation or  other acute abnormality. Lower lumbar facet hypertrophy with reactive sclerosis. Bilateral total hip prostheses. IMPRESSION     No acute finding, mild/moderate degenerative changes as described. Pain:  Current: 3/10    Worst: 6/10   Best: 3/10    ===========================================================================================    Objective:   FOTO score = 56 (an established functional score where 100 = no disability)    Posture: FHRS, increased T/S kyphosis, B hiked UT    Functional Activity:  Sit to stand - IND no pain  Bed mobility - pain with transitions with referral into chest wall   Prone lying - chest wall referral pain  Supine lying - pain  mid back with slight referral into chest wall    Dermatomes: slight altered sensation below pec height      Thoracolumbar AROM  Flexion: 90% no pain   Extension: 50% hinge at L1-2  L SB: 50% muscle spasms B  R SB: 75% no pain   L Rot: 50% slight anterior chest wall pain  R Rot: 60% slight anterior chest wall pain    Shoulder AROM   R  deg flexion 140 deg   L  deg flexion 138 deg   DAWNA B T2    Palpation - standing no T/S TTP. Prone lying TTP along T6-8 with referral pattern into chest wall      Pt S&S align with possible pinched nerve or rib rerral pain in T6 - T7, T7 -T8. Pt would benefit form skilled PT services addressing the current ROM, strength, functional performance, and balance impairments so that the patient to return to performing all ADLs with minimal restriction/limitation or without any functional limitations. HEP provided to the patient in order to promote improvement and self management of symptoms and functional performance:   Access Code: [email or web address]  URL: [email or web address]  Date: 12/16/2022  Prepared by: Ting Hale    Exercises  Sidelying Thoracic Rotation with Open Book - 1-2 x daily - 7 x weekly - 5-10 reps  Seated Trunk Rotation - Arms Crossed - 1-2 x daily - 7 x weekly - 2 reps - 15\" hold  Seated Diaphragmatic Breathing - 1 x daily - 7 x weekly - 10-15 reps - 3\" hold  Standing 'L' Stretch at Counter - 1-2 x daily - 7 x weekly - 5 reps - 5\" hold  Seated Scapular Retraction - 4 x daily - 7 x weekly - 10 reps - 3\" hold  Advised on core bracing for transition movements     ===========================================================================================  Eval Complexity: History HIGH Complexity :3+ comorbidities / personal factors will impact the outcome/ POC ;  Examination  LOW Complexity : 1-2 Standardized tests and measures addressing body structure, function, activity limitation and / or participation in recreation ; Presentation MEDIUM Complexity : Evolving with changing characteristics ;   Decision Making MEDIUM Complexity : FOTO score of 26-74; Overall Complexity LOW   Problem List: pain affecting function, decrease ROM, decrease strength, decrease ADL/ functional abilitiies, decrease activity tolerance, decrease flexibility/ joint mobility, and decrease transfer abilities   Treatment Plan may include any combination of the following: Therapeutic exercise, Neuromuscular reeducation, Manual therapy, Therapeutic activity, Self care/home management, Electric stim unattended , Needle insertion w/o injection (1 or 2 muscles), and Needle insertion w/o injection (3+ muscles)  Patient / Family readiness to learn indicated by: asking questions, trying to perform skills, and interest  Persons(s) to be included in education: patient (P)  Barriers to Learning/Limitations: None  Measures taken, if barriers to learning:    Patient Goal (s): \"Get rid of the pain\"   Patient self reported health status: good  Rehabilitation Potential: good  Short Term Goals: To be accomplished in  2-3  weeks. 1) Pt will be IND with HEP to facilitate self care management. 2) Pt will improve T/S ROM to L/R rot >65% to order to improve ability to exercise without pain or restriction    3) Pt will improve worst pain levels from initial evaluation level of 6/10 to 3/10 to show improved QOL and improved overall perception of pain-free/more pain-free function with ADLs. Long Term Goals: To be accomplished in  4 weeks. 1) Pt will maintain an average pain of 3/10 or less with all activities showing a progression in overall pain levels despite increases in activity. 2) Pt will improve FOTO scores to 68 in order to show detectable change in overall function. 3) Pt will be able to perform return to light weight lifting without sharp pain in T/S or referral into chest wall signifying improvement in overall functional capacity and activity tolerance. 4) Pt will improve T/S ROM to L/R rot >80% to order to improve ability to exercise without pain or restriction    Frequency / Duration:   Patient to be seen  1-2  times per week for 4  weeks. Patient / Caregiver education and instruction: self care, activity modification, and exercises  Therapist Signature: Andrea Eubanks PT Date: 83/77/7272   Certification Period: 12/16/22-3/16/23 Time: 7:46 AM   ===========================================================================================  I certify that the above Physical Therapy Services are being furnished while the patient is under my care. I agree with the treatment plan and certify that this therapy is necessary.     Physician Signature:        Date:       Time:                                        Jenna Eid MD  Please sign and return to [facility], [facility]. or you may fax the signed copy to [phone]. Thank you.

## 2022-12-16 NOTE — PROGRESS NOTES
PHYSICAL THERAPY - DAILY TREATMENT NOTE    Patient Name: Viri Abreu        Date: 2022  : 1955   YES Patient  Verified  Visit #:     Insurance: Payor: Esperanza Arora / Plan: [place] MED[place]ARE PART A & B / Product Type: Medicare /      In time: 236 Out time: 920   Total Treatment Time: 50     Medicare/BCBS Time Tracking (below)   Total Timed Codes (min):  25 1:1 Treatment Time:  50     TREATMENT AREA =  Other low back pain [M54.59]    SUBJECTIVE    Pain Level (on 0 to 10 scale):  3   10   Medication Changes/New allergies or changes in medical history, any new surgeries or procedures? NO    If yes, update Summary List   Subjective Functional Status/Changes:  []  No changes reported     See POC          OBJECTIVE  25 min Self Care: Reviewed differential diagnosis, prognosis, therapy progression  Reviewed and educated on HEP     Rationale:    Improve understanding of injury and therapy to have realistic expectation of therapy to improve compliance/adherence and satisfaction    Billed With/As:   [] TE   [] TA   [] Neuro   [x] Self Care Patient Education: [x] Review HEP    [] Progressed/Changed HEP based on:   [] positioning   [x] body mechanics   [x] transfers   [x] heat/ice application    [] other: core bracing with transitional movements. Activity modification.  Tissue healing time       Other Objective/Functional Measures:    Shown and performed HEP     Post Treatment Pain Level (on 0 to 10) scale:   3 / 10     ASSESSMENT  Assessment/Changes in Function:     See POC     []  See Progress Note/Recertification   Patient will continue to benefit from skilled PT services to modify and progress therapeutic interventions, address functional mobility deficits, address ROM deficits, address strength deficits, analyze and address soft tissue restrictions, analyze and cue movement patterns, analyze and modify body mechanics/ergonomics, assess and modify postural abnormalities, and instruct in home and community integration to attain goals per POC.    Progress toward goals / Updated goals:    See POC     PLAN  [x]  Upgrade activities as tolerated YES Continue plan of care   []  Discharge due to :    [x]  Other: 1-2x per week for 4 weeks     Therapist: Chris Washington PT    Date: 12/16/2022 Time: 7:45 AM     Future Appointments   Date Time Provider Ivis Guo   12/19/2022  3:15 PM Terrence East, PT [facility]   12/23/2022  3:00 PM Terrence East, PT [facility]   12/29/2022  3:20 PM Veleta Prim [facility]   1/4/2023  3:30 PM Terrence East, PT [facility]   1/6/2023  3:30 PM Terrence East, [facility]   1/9/2023  3:30 PM Terrence East, PT [facility]   1/18/2023 11:00 AM Casimiro Greenwood MD BSMA BS AMB

## 2022-12-19 ENCOUNTER — HOSPITAL ENCOUNTER (OUTPATIENT)
Dept: PHYSICAL THERAPY | Age: 67
Discharge: HOME | End: 2022-12-19
Payer: MEDICARE

## 2022-12-19 ENCOUNTER — TELEPHONE (OUTPATIENT)
Dept: FAMILY MEDICINE CLINIC | Age: 67
End: 2022-12-19

## 2022-12-19 ENCOUNTER — TELEPHONE (OUTPATIENT)
Dept: PHYSICAL THERAPY | Age: 67
End: 2022-12-19

## 2022-12-19 PROCEDURE — 97535 SELF CARE MNGMENT TRAINING: CPT

## 2022-12-19 PROCEDURE — 97110 THERAPEUTIC EXERCISES: CPT

## 2022-12-19 PROCEDURE — 97112 NEUROMUSCULAR REEDUCATION: CPT

## 2022-12-19 NOTE — TELEPHONE ENCOUNTER
Angel Barth from [employer] called on behalf of the patient, he has been seeing him for low back pain which he states the patient is fine with. He feels like further follow up is needed. The pain in his back is more in the mid back/spine area and he described it as intense. He would like the patient to either be seen or have an xray done on his mid back area to rule out rip fracture. Please advise.

## 2022-12-20 ENCOUNTER — TELEPHONE (OUTPATIENT)
Dept: FAMILY MEDICINE CLINIC | Age: 67
End: 2022-12-20

## 2022-12-20 NOTE — TELEPHONE ENCOUNTER
Pt came into the office today in regards to PT. Pt stated that the PT  informed him to schedule appt with PCP to have an xray done. Therapist informed him that there may be a fracture so he may need to have that looked at. Pt says that it is more like an upper torso concern and that the PT feels like PT is a waste of time because he feels worse not better. Pt is scheduled for future appt.     Future Appointments   Date Time Provider Ivis Guo   12/23/2022  3:00 PM Flex Rowe, PT  [facility]   12/29/2022  3:20 PM Skythom Cox  [facility]   1/3/2023 10:30 AM MD TAMIKA Lopes BS AMB   1/4/2023  3:30 PM Flex Rowe, PT  [facility]   1/6/2023  3:30 PM Flex Rowe, PT  [facility]   1/9/2023  3:30 PM Flex Rowe, PT  [facility]   1/18/2023 11:00 AM MD TAMIKA Lopes BS AMB

## 2022-12-21 NOTE — TELEPHONE ENCOUNTER
Pt has appt for sooner.      Future Appointments   Date Time Provider Ivis Guo   1/3/2023 10:30 AM Caron Pisano MD [facility] BS AMB

## 2022-12-21 NOTE — TELEPHONE ENCOUNTER
Please schedule appointment for follow up   He is scheduled for 1/18 does he need to come sooner ? ?

## 2022-12-23 ENCOUNTER — HOSPITAL ENCOUNTER (OUTPATIENT)
Dept: PHYSICAL THERAPY | Age: 67
Discharge: HOME | End: 2022-12-23
Payer: MEDICARE

## 2022-12-23 PROCEDURE — 97140 MANUAL THERAPY 1/> REGIONS: CPT

## 2022-12-23 NOTE — PROGRESS NOTES
PHYSICAL THERAPY - DAILY TREATMENT NOTE    Patient Name: Jude Carlson        Date: 2022  : 1955   yes Patient  Verified  Visit #:   3   of   8  Insurance: Payor: Saeid Lee / Plan: [place] MEDICARE PART A & B / Product Type: Medicare /      In time: 314 Out time: 303   Total Treatment Time: 40     Medicare/BCBS Time Tracking (below)   Total Timed Codes (min):  30 1:1 Treatment Time:  30     TREATMENT AREA =  Other low back pain [M54.59]    SUBJECTIVE  Pain Level (on 0 to 10 scale):  2  / 10 when it gets jolt-like pain 4-5/10. Medication Changes/New allergies or changes in medical history, any new surgeries or procedures?    no  If yes, update Summary List   Subjective Functional Status/Changes:  []  No changes reported     Patient reports feeling it in the R lateral flank. Feels it more when sitting and doing nothing, but when moving he doesn't feel it. Feels like an electric jolt. OBJECTIVE  Modalities Rationale:     decrease pain to improve patient's ability to perform ADLs with decreased pain and improved mobility.    min [] Estim, type/location:                                      []  att     []  unatt     []  w/US     []  w/ice    []  w/heat    min []  Mechanical Traction: type/lbs                   []  pro   []  sup   []  int   []  cont    []  before manual    []  after manual    min []  Ultrasound, settings/location:      min []  Iontophoresis w/ dexamethasone, location:                                               []  take home patch       []  in clinic   10 min []  Ice     [x]  Heat    location/position: Seated thoracic spine    min []  Vasopneumatic Device, press/temp:    If using vaso (only need to measure limb vaso being performed on)      pre-treatment girth :       post-treatment girth :       measured at (landmark location) :      min []  Other:    [] Skin assessment post-treatment (if applicable):    []  intact    []  redness- no adverse reaction []redness - adverse reaction:        30 min Manual Therapy: L S/L IASTm along T6-8 intercostals and T/S paraspinals  L S/L rib angle PA mobs  6-8 and CPA T6-8   Seated anterior rot mobs rib angle 6-8 on R    Rationale:      decrease pain, increase ROM, increase tissue extensibility, decrease edema , and decrease trigger points to improve patient's ability to perform general ADLs with decrease c/o symptoms and with improved functional performance. The manual therapy interventions were performed at a separate and distinct time from the therapeutic activities interventions. Billed With/As:   [x] TE   [] TA   [] Neuro   [] Self Care Patient Education: [x] Review HEP    [] Progressed/Changed HEP based on:   [] positioning   [] body mechanics   [] transfers   [] heat/ice application    [] other:      Other Objective/Functional Measures:    L and R rot limited with p! In T7 rib   Ext caused some lateral rib 7 p! Post Treatment Pain Level (on 0 to 10) scale:   1  / 10     ASSESSMENT  Assessment/Changes in Function:     Patient tolerated today's treatment fair with slight improvement in R mobility with less pain in the R flank area. Tolearted L S/L well (not tolerant to prone, excessive supine or R S/L) . Stuck mostly with manual today which he tolerated well. Reported some spasms I the R but no electric like pain. Advised on L SB stretching with arm OH for opening of R flank. `   ``   Progress toward goals / Updated goals:    Patient is making fair progress towards their goals at this time. Less pain noted today with less electric like pain. All goals remain applicable at this time.               PLAN  [x]  Upgrade activities as tolerated yes Continue plan of care   []  Discharge due to :    []  Other:      Therapist: Jorje Draper, PT    Date: 12/23/2022 Time: 7:32 AM     Future Appointments   Date Time Provider Ivis Guo   12/23/2022  3:00 PM Rizwana Keller PT [facility]   12/29/2022  3:20 PM Josef Bougie [facility]   1/3/2023 10:30 AM MD TAMIKA Randall BS AMB   1/4/2023  3:30 PM Sarah Niño, PT [facility]   1/6/2023  3:30 PM Sarah Niño, PT [facility]   1/9/2023  3:30 PM Sarah Niño, PT [facility]   1/18/2023 11:00 AM MD TAMIKA Randall BS AMB

## 2022-12-29 ENCOUNTER — APPOINTMENT (OUTPATIENT)
Dept: PHYSICAL THERAPY | Age: 67
End: 2022-12-29
Payer: MEDICARE

## 2023-01-03 ENCOUNTER — OFFICE VISIT (OUTPATIENT)
Dept: FAMILY MEDICINE CLINIC | Age: 68
End: 2023-01-03
Payer: MEDICARE

## 2023-01-03 VITALS
DIASTOLIC BLOOD PRESSURE: 70 MMHG | BODY MASS INDEX: 36.32 KG/M2 | SYSTOLIC BLOOD PRESSURE: 110 MMHG | OXYGEN SATURATION: 98 % | RESPIRATION RATE: 16 BRPM | TEMPERATURE: 97.8 F | HEART RATE: 65 BPM | WEIGHT: 218 LBS | HEIGHT: 65 IN

## 2023-01-03 DIAGNOSIS — M54.10 RADICULOPATHY AFFECTING UPPER EXTREMITY: ICD-10-CM

## 2023-01-03 DIAGNOSIS — M54.6 ACUTE MIDLINE THORACIC BACK PAIN: Primary | ICD-10-CM

## 2023-01-03 DIAGNOSIS — M47.24 OSTEOARTHRITIS OF SPINE WITH RADICULOPATHY, THORACIC REGION: ICD-10-CM

## 2023-01-03 PROCEDURE — 99213 OFFICE O/P EST LOW 20 MIN: CPT | Performed by: LEGAL MEDICINE

## 2023-01-03 PROCEDURE — G8510 SCR DEP NEG, NO PLAN REQD: HCPCS | Performed by: LEGAL MEDICINE

## 2023-01-03 PROCEDURE — 1123F ACP DISCUSS/DSCN MKR DOCD: CPT | Performed by: LEGAL MEDICINE

## 2023-01-03 PROCEDURE — 3017F COLORECTAL CA SCREEN DOC REV: CPT | Performed by: LEGAL MEDICINE

## 2023-01-03 PROCEDURE — G8536 NO DOC ELDER MAL SCRN: HCPCS | Performed by: LEGAL MEDICINE

## 2023-01-03 PROCEDURE — G8417 CALC BMI ABV UP PARAM F/U: HCPCS | Performed by: LEGAL MEDICINE

## 2023-01-03 PROCEDURE — G8427 DOCREV CUR MEDS BY ELIG CLIN: HCPCS | Performed by: LEGAL MEDICINE

## 2023-01-03 PROCEDURE — 1101F PT FALLS ASSESS-DOCD LE1/YR: CPT | Performed by: LEGAL MEDICINE

## 2023-01-03 RX ORDER — METHOCARBAMOL 500 MG/1
500 TABLET, FILM COATED ORAL 4 TIMES DAILY
COMMUNITY

## 2023-01-03 NOTE — PROGRESS NOTES
Prince Rivero is a 75 y.o. male (: 1955) presenting to address:    Chief Complaint   Patient presents with    Follow-up     Back pain. Vitals:    23 1026   BP: 110/70   Pulse: 65   Resp: 16   Temp: 97.8 °F (36.6 °C)   TempSrc: Temporal   SpO2: 98%   Weight: 218 lb (98.9 kg)   Height: 5' 5\" (1.651 m)   PainSc:   2   PainLoc: Back       Hearing/Vision:   No results found. Learning Assessment:     Learning Assessment 2019   PRIMARY LEARNER Patient   HIGHEST LEVEL OF EDUCATION - PRIMARY LEARNER  2 YEARS OF COLLEGE   BARRIERS PRIMARY LEARNER NONE   CO-LEARNER CAREGIVER No   PRIMARY LANGUAGE ENGLISH    NEED No   LEARNER PREFERENCE PRIMARY READING   LEARNING SPECIAL TOPICS none   ANSWERED BY patient   RELATIONSHIP SELF   ASSESSMENT COMMENT n/a     Depression Screening:     3 most recent PHQ Screens 1/3/2023   PHQ Not Done -   Little interest or pleasure in doing things Not at all   Feeling down, depressed, irritable, or hopeless Not at all   Total Score PHQ 2 0     Fall Risk Assessment:     Fall Risk Assessment, last 12 mths 1/3/2023   Able to walk? Yes   Fall in past 12 months? 0   Do you feel unsteady? 0   Are you worried about falling 0   Is the gait abnormal? -   Number of falls in past 12 months -   Fall with injury? -     Abuse Screening:     Abuse Screening Questionnaire 2022   Do you ever feel afraid of your partner? N   Are you in a relationship with someone who physically or mentally threatens you? N   Is it safe for you to go home?  Y     ADL Assessment:     ADL Assessment 2022   Feeding yourself No Help Needed   Getting from bed to chair No Help Needed   Getting dressed No Help Needed   Bathing or showering No Help Needed   Walk across the room (includes cane/walker) No Help Needed   Using the telphone No Help Needed   Taking your medications No Help Needed   Preparing meals No Help Needed   Managing money (expenses/bills) No Help Needed   Moderately strenuous housework (laundry) No Help Needed   Shopping for personal items (toiletries/medicines) No Help Needed   Shopping for groceries No Help Needed   Driving No Help Needed   Climbing a flight of stairs No Help Needed   Getting to places beyond walking distances No Help Needed        Coordination of Care Questionaire:   1. \"Have you been to the ER, urgent care clinic since your last visit? Hospitalized since your last visit? \" No    2. \"Have you seen or consulted any other health care providers outside of the [address] [place] [address] [place] since your last visit? \" Yes When: 12/30/2022 Where: PT Reason for visit: follow up       3. For patients aged 39-70: Has the patient had a colonoscopy? Yes - Care Gap present. Most recent result on file     If the patient is female:    4. For patients aged 41-77: Has the patient had a mammogram within the past 2 years? No    5. For patients aged 21-65: Has the patient had a pap smear? No    Advanced Directive:   1. Do you have an Advanced Directive? NO    2. Would you like information on Advanced Directives?  NO

## 2023-01-03 NOTE — PROGRESS NOTES
Jonathon Curry     Chief Complaint   Patient presents with    Follow-up     Back pain. Vitals:    01/03/23 1026   BP: 110/70   Pulse: 65   Resp: 16   Temp: 97.8 °F (36.6 °C)   TempSrc: Temporal   SpO2: 98%   Weight: 218 lb (98.9 kg)   Height: 5' 5\" (1.651 m)   PainSc:   2   PainLoc: Back         HPI: Shantel Mom is here because his upper back pain has not improved discussed with physical therapy actually is start worsening, he has pain in his upper back and radiate to the sides of his both sides , he feels sharp pain, he has been exercising and doing weight lifting he does not feel any pain during working out but after words, also physical therapy exercises were making his pain worse afterwards. 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 ([address]     Cataract     Hypercholesterolemia     Hypertension     Ischemic cardiomyopathy      Past Surgical History:   Procedure Laterality Date    HX CORNEAL TRANSPLANT Right 2016    HX CORNEAL TRANSPLANT Left 2018    HX CORONARY STENT PLACEMENT  03/2015    HX HIP REPLACEMENT Bilateral 2011     Social History     Tobacco Use    Smoking status: Some Days     Types: Cigars    Smokeless tobacco: Never   Substance Use Topics    Alcohol use: Not Currently       Family History   Problem Relation Age of Onset    Heart Disease Father        Review of Systems   Constitutional:  Negative for chills, fever, malaise/fatigue and weight loss. HENT:  Negative for congestion, ear discharge, ear pain, hearing loss and nosebleeds. Eyes:  Negative for blurred vision, double vision and discharge. Respiratory:  Negative for cough.     Cardiovascular:  Negative for chest pain, palpitations, claudication and leg swelling. Gastrointestinal:  Negative for abdominal pain, constipation, diarrhea, nausea and vomiting. Genitourinary:  Negative for dysuria, frequency and urgency. Musculoskeletal:  Positive for back pain and myalgias. Negative for falls, joint pain and neck pain. Skin:  Negative for itching and rash. Neurological:  Negative for dizziness, tingling, sensory change, speech change, focal weakness, weakness and headaches. Physical Exam  Vitals and nursing note reviewed. Constitutional:       General: He is not in acute distress. Appearance: Normal appearance. He is well-developed. He is not ill-appearing, toxic-appearing or diaphoretic. HENT:      Head: Normocephalic and atraumatic. Eyes:      General: No scleral icterus. Right eye: No discharge. Left eye: No discharge. Neck:      Thyroid: No thyromegaly. Cardiovascular:      Rate and Rhythm: Normal rate and regular rhythm. Heart sounds: Normal heart sounds. Pulmonary:      Effort: Pulmonary effort is normal. No respiratory distress. Breath sounds: Normal breath sounds. No rales. Abdominal:      General: Bowel sounds are normal. There is no distension. Palpations: Abdomen is soft. There is no mass. Tenderness: There is no abdominal tenderness. There is no rebound. Musculoskeletal:         General: Tenderness present. No deformity or signs of injury. Normal range of motion. Cervical back: Normal range of motion and neck supple. No rigidity or tenderness. Right lower leg: No edema. Left lower leg: No edema. Comments: Patient has tenderness in the upper back and also muscular pain on the scapular area bilaterally    Shoulder examination is normal bilaterally  And upper arm exam is within normal limits   Lymphadenopathy:      Cervical: No cervical adenopathy. Skin:     General: Skin is warm and dry. Findings: No erythema or rash. Neurological:      General: No focal deficit present. Mental Status: He is alert and oriented to person, place, and time. Cranial Nerves: No cranial nerve deficit. Motor: No weakness. Coordination: Coordination normal.      Gait: Gait normal.   Psychiatric:         Mood and Affect: Mood normal.         Behavior: Behavior normal.         Thought Content: Thought content normal.         Judgment: Judgment normal.        Assessment and plan     Plan of care has been discussed with the patient, he agrees to the plan and verbalized understanding. All his questions were answered  More than 50% of the time spent in this visit was counseling the patient about  illness and treatment options         1. Acute midline thoracic back pain    - XR SPINE THORAC 3 V; Future  Patient will be contacted with the results of the x-ray and for any further imaging if needed    2. Radiculopathy affecting upper extremity    - XR SPINE THORAC 3 V; Future    Current Outpatient Medications   Medication Sig Dispense Refill    methocarbamoL (ROBAXIN) 500 mg tablet Take 500 mg by mouth four (4) times daily. prednisoLONE acetate (PRED FORTE) 1 % ophthalmic suspension       lisinopriL (PRINIVIL, ZESTRIL) 10 mg tablet Take 1 Tablet by mouth daily. 90 Tablet 1    atorvastatin (LIPITOR) 80 mg tablet Take 1 Tablet by mouth daily. 90 Tablet 3    carvediloL (COREG) 6.25 mg tablet Take 1 Tablet by mouth two (2) times daily (with meals). 180 Tablet 1    spironolactone (ALDACTONE) 25 mg tablet Take 1 Tablet by mouth daily. 90 Tablet 3    furosemide (LASIX) 40 mg tablet Take 1 Tablet by mouth daily. 90 Tablet 3    aspirin delayed-release 81 mg tablet Take 1 Tablet by mouth daily. 90 Tablet 3    brimonidine-timoloL (COMBIGAN) 0.2-0.5 % drop ophthalmic solution Apply 1 Drop to eye daily. methocarbamoL (ROBAXIN) 500 mg tablet Take 1 Tablet by mouth three (3) times daily as needed for Muscle Spasm(s) for up to 10 days.  40 Tablet 0 Patient Active Problem List    Diagnosis Date Noted    Coronary artery disease involving native coronary artery of native heart without angina pectoris 02/27/2019    Ischemic cardiomyopathy 02/27/2019     Results for orders placed or performed during the hospital encounter of 91/17/74   METABOLIC PANEL, COMPREHENSIVE   Result Value Ref Range    Sodium 139 136 - 145 mmol/L    Potassium 4.3 3.5 - 5.5 mmol/L    Chloride 108 100 - 111 mmol/L    CO2 25 21 - 32 mmol/L    Anion gap 6 3.0 - 18 mmol/L    Glucose 87 74 - 99 mg/dL    BUN 29 (H) 7.0 - 18 MG/DL    Creatinine 1.06 0.6 - 1.3 MG/DL    BUN/Creatinine ratio 27 (H) 12 - 20      GFR est AA >60 >60 ml/min/1.73m2    GFR est non-AA >60 >60 ml/min/1.73m2    Calcium 8.7 8.5 - 10.1 MG/DL    Bilirubin, total 0.7 0.2 - 1.0 MG/DL    ALT (SGPT) 38 16 - 61 U/L    AST (SGOT) 26 10 - 38 U/L    Alk. phosphatase 97 45 - 117 U/L    Protein, total 6.6 6.4 - 8.2 g/dL    Albumin 3.7 3.4 - 5.0 g/dL    Globulin 2.9 2.0 - 4.0 g/dL    A-G Ratio 1.3 0.8 - 1.7     LIPID PANEL   Result Value Ref Range    LIPID PROFILE          Cholesterol, total 138 <200 MG/DL    Triglyceride 133 <150 MG/DL    HDL Cholesterol 42 40 - 60 MG/DL    LDL, calculated 69.4 0 - 100 MG/DL    VLDL, calculated 26.6 MG/DL    CHOL/HDL Ratio 3.3 0 - 5.0     CBC WITH AUTOMATED DIFF   Result Value Ref Range    WBC 8.4 4.6 - 13.2 K/uL    RBC 5.01 4.35 - 5.65 M/uL    HGB 15.9 13.0 - 16.0 g/dL    HCT 47.7 36.0 - 48.0 %    MCV 95.2 78.0 - 100.0 FL    MCH 31.7 24.0 - 34.0 PG    MCHC 33.3 31.0 - 37.0 g/dL    RDW 13.2 11.6 - 14.5 %    PLATELET 106 280 - 942 K/uL    MPV 9.3 9.2 - 11.8 FL    NRBC 0.0 0  WBC    ABSOLUTE NRBC 0.00 0.00 - 0.01 K/uL    NEUTROPHILS 71 40 - 73 %    LYMPHOCYTES 14 (L) 21 - 52 %    MONOCYTES 11 (H) 3 - 10 %    EOSINOPHILS 3 0 - 5 %    BASOPHILS 1 0 - 2 %    IMMATURE GRANULOCYTES 0 0.0 - 0.5 %    ABS. NEUTROPHILS 6.0 1.8 - 8.0 K/UL    ABS. LYMPHOCYTES 1.1 0.9 - 3.6 K/UL    ABS.  MONOCYTES 0.9 0.05 - 1.2 K/UL    ABS. EOSINOPHILS 0.3 0.0 - 0.4 K/UL    ABS. BASOPHILS 0.1 0.0 - 0.1 K/UL    ABS. IMM. GRANS. 0.0 0.00 - 0.04 K/UL    DF AUTOMATED     HEMOGLOBIN A1C W/O EAG   Result Value Ref Range    Hemoglobin A1c 5.6 4.2 - 5.6 %     No visits with results within 3 Month(s) from this visit. Latest known visit with results is:   Hospital Outpatient Visit on 07/19/2022   Component Date Value Ref Range Status    Sodium 07/19/2022 139  136 - 145 mmol/L Final    Potassium 07/19/2022 4.3  3.5 - 5.5 mmol/L Final    Chloride 07/19/2022 108  100 - 111 mmol/L Final    CO2 07/19/2022 25  21 - 32 mmol/L Final    Anion gap 07/19/2022 6  3.0 - 18 mmol/L Final    Glucose 07/19/2022 87  74 - 99 mg/dL Final    BUN 07/19/2022 29 (A)  7.0 - 18 MG/DL Final    Creatinine 07/19/2022 1.06  0.6 - 1.3 MG/DL Final    BUN/Creatinine ratio 07/19/2022 27 (A)  12 - 20   Final    GFR est AA 07/19/2022 >60  >60 ml/min/1.73m2 Final    GFR est non-AA 07/19/2022 >60  >60 ml/min/1.73m2 Final    Comment: (NOTE)  Estimated GFR is calculated using the Modification of Diet in Renal   Disease (MDRD) Study equation, reported for both  Americans   (GFRAA) and non- Americans (GFRNA), and normalized to 1.73m2   body surface area. The physician must decide which value applies to   the patient. The MDRD study equation should only be used in   individuals age 25 or older. It has not been validated for the   following: pregnant women, patients with serious comorbid conditions,   or on certain medications, or persons with extremes of body size,   muscle mass, or nutritional status. Calcium 07/19/2022 8.7  8.5 - 10.1 MG/DL Final    Bilirubin, total 07/19/2022 0.7  0.2 - 1.0 MG/DL Final    ALT (SGPT) 07/19/2022 38  16 - 61 U/L Final    AST (SGOT) 07/19/2022 26  10 - 38 U/L Final    Alk.  phosphatase 07/19/2022 97  45 - 117 U/L Final    Protein, total 07/19/2022 6.6  6.4 - 8.2 g/dL Final    Albumin 07/19/2022 3.7  3.4 - 5.0 g/dL Final    Globulin 07/19/2022 2.9  2.0 - 4.0 g/dL Final    A-G Ratio 07/19/2022 1.3  0.8 - 1.7   Final    LIPID PROFILE 07/19/2022        Final    Cholesterol, total 07/19/2022 138  <200 MG/DL Final    Triglyceride 07/19/2022 133  <150 MG/DL Final    Comment: The drugs N-acetylcysteine (NAC) and  Metamiszole have been found to cause falsely  low results in this chemical assay. Please  be sure to submit blood samples obtained  BEFORE administration of either of these  drugs to assure correct results. HDL Cholesterol 07/19/2022 42  40 - 60 MG/DL Final    LDL, calculated 07/19/2022 69.4  0 - 100 MG/DL Final    VLDL, calculated 07/19/2022 26.6  MG/DL Final    CHOL/HDL Ratio 07/19/2022 3.3  0 - 5.0   Final    WBC 07/19/2022 8.4  4.6 - 13.2 K/uL Final    RBC 07/19/2022 5.01  4.35 - 5.65 M/uL Final    HGB 07/19/2022 15.9  13.0 - 16.0 g/dL Final    HCT 07/19/2022 47.7  36.0 - 48.0 % Final    MCV 07/19/2022 95.2  78.0 - 100.0 FL Final    MCH 07/19/2022 31.7  24.0 - 34.0 PG Final    MCHC 07/19/2022 33.3  31.0 - 37.0 g/dL Final    RDW 07/19/2022 13.2  11.6 - 14.5 % Final    PLATELET 31/06/4728 591  135 - 420 K/uL Final    MPV 07/19/2022 9.3  9.2 - 11.8 FL Final    NRBC 07/19/2022 0.0  0  WBC Final    ABSOLUTE NRBC 07/19/2022 0.00  0.00 - 0.01 K/uL Final    NEUTROPHILS 07/19/2022 71  40 - 73 % Final    LYMPHOCYTES 07/19/2022 14 (A)  21 - 52 % Final    MONOCYTES 07/19/2022 11 (A)  3 - 10 % Final    EOSINOPHILS 07/19/2022 3  0 - 5 % Final    BASOPHILS 07/19/2022 1  0 - 2 % Final    IMMATURE GRANULOCYTES 07/19/2022 0  0.0 - 0.5 % Final    ABS. NEUTROPHILS 07/19/2022 6.0  1.8 - 8.0 K/UL Final    ABS. LYMPHOCYTES 07/19/2022 1.1  0.9 - 3.6 K/UL Final    ABS. MONOCYTES 07/19/2022 0.9  0.05 - 1.2 K/UL Final    ABS. EOSINOPHILS 07/19/2022 0.3  0.0 - 0.4 K/UL Final    ABS. BASOPHILS 07/19/2022 0.1  0.0 - 0.1 K/UL Final    ABS. IMM.  GRANS. 07/19/2022 0.0  0.00 - 0.04 K/UL Final    DF 07/19/2022 AUTOMATED    Final    Hemoglobin A1c 07/19/2022 5.6 4.2 - 5.6 % Final    Comment: (NOTE)  HbA1C Interpretive Ranges  <5.7              Normal  5.7 - 6.4         Consider Prediabetes  >6.5              Consider Diabetes

## 2023-01-04 ENCOUNTER — HOSPITAL ENCOUNTER (OUTPATIENT)
Dept: PHYSICAL THERAPY | Age: 68
Discharge: HOME | End: 2023-01-04
Payer: MEDICARE

## 2023-01-04 PROCEDURE — 97110 THERAPEUTIC EXERCISES: CPT

## 2023-01-04 PROCEDURE — 97014 ELECTRIC STIMULATION THERAPY: CPT

## 2023-01-04 PROCEDURE — 97140 MANUAL THERAPY 1/> REGIONS: CPT

## 2023-01-04 NOTE — PROGRESS NOTES
`3EPHYSICAL THERAPY - DAILY TREATMENT NOTE    Patient Name: Blanca Varela        Date: 2023  : 1955   yes Patient  Verified  Visit #:   4   of   8  Insurance: Payor: Mookie Mckeon / Plan: [place] & B / Product Type: Medicare /      In time: 325 Out time: 400   Total Treatment Time: 35     Medicare/BCBS Time Tracking (below)   Total Timed Codes (min):  25 1:1 Treatment Time:  25     TREATMENT AREA =  Other low back pain [M54.59]    SUBJECTIVE  Pain Level (on 0 to 10 scale):  1  / 10   Medication Changes/New allergies or changes in medical history, any new surgeries or procedures?    no  If yes, update Summary List   Subjective Functional Status/Changes:  []  No changes reported     Patient reports he had the Xray yesterday. Some multilevel degeneration  in the T/S. Still having pain in the midback and some feeling of heaviness in the chest wall. Can workout still but feels it later about 2 hours lateral. Pt has not noticed any significant pain change since starting. Some stretches seem to help the pain. Pain can get up to a 5/10. OBJECTIVE  Modalities Rationale:     decrease pain to improve patient's ability to perform ADLs with decreased pain and improved mobility.                10 min [] Estim, type/location:                             Seated thoracic spine IFC                                     []  att     [x]  unatt     []  w/US     []  w/ice    [x]  w/heat     min []  Mechanical Traction: type/lbs                    []  pro   []  sup   []  int   []  cont    []  before manual    []  after manual     min []  Ultrasound, settings/location:        min []  Iontophoresis w/ dexamethasone, location:                                                []  take home patch       []  in clinic    min []  Ice     []  Heat    location/position:      min []  Vasopneumatic Device, press/temp:     If using vaso (only need to measure limb vaso being performed on)      pre-treatment girth : post-treatment girth :       measured at (landmark location) :       min []  Other:     [] Skin assessment post-treatment (if applicable):    []  intact    []  redness- no adverse reaction                  []redness - adverse reaction:         17 min Therapeutic Exercise:  [x]  See flow sheet   Rationale:      increase ROM, increase strength, improve coordination, improve balance, and increase proprioception to improve the patients ability to perform general ADLs with decrease c/o symptoms and with improved functional performance. 8 min Manual Therapy: L S/L rib angle 5-7 posterior rot    Rationale:      decrease pain, increase ROM, increase tissue extensibility, decrease edema , and decrease trigger points to improve patient's ability to perform general ADLs with decrease c/o symptoms and with improved functional performance. The manual therapy interventions were performed at a separate and distinct time from the therapeutic activities interventions. Billed With/As:   [] TE   [] TA   [] Neuro   [] Self Care Patient Education: [] Review HEP    [] Progressed/Changed HEP based on:   [] positioning   [] body mechanics   [] transfers   [] heat/ice application    [] other:      Other Objective/Functional Measures:    Weight R side bends cause spasm on the R with compression on the R  R rot causes some rib angle pain  TTP along rib angle 5-6     No relief with L SB stretching. Post Treatment Pain Level (on 0 to 10) scale:   3  / 10     ASSESSMENT  Assessment/Changes in Function:     Patient tolerated today's treatment poor. Very TTP along the R rib angle 5-6 and sharp pain depending on the motion. Difficult to arrive at diagnosis for pain. Tried focusing on posterior rot of rib angle to facilitate improved R rot. No change in pain but better mobility   Pt got cramping and spasms in the R side with weight SB. Ended with IFC and MHP to see if that results in better pain control.    Emphasized seeing one more visit to see if possible change in pain and then refer back to doctor. []  See Progress Note/Recertification   Patient will continue to benefit from skilled PT services to modify and progress therapeutic interventions, address functional mobility deficits, address ROM deficits, address strength deficits, analyze and address soft tissue restrictions, analyze and cue movement patterns, analyze and modify body mechanics/ergonomics, assess and modify postural abnormalities, and instruct in home and community integration to attain remaining goals. Progress toward goals / Updated goals:    Patient is making poo progress towards their goals at this time. No sig change in pain made since IE as reported by pt. All goals remain applicable at this time.  Cont one more visit to see if functional change made     PLAN  [x]  Upgrade activities as tolerated yes Continue plan of care   []  Discharge due to :    []  Other:      Therapist: Sheldon Lagunas PT    Date: 1/4/2023 Time: 12:12 PM     Future Appointments   Date Time Provider Ivis Guo   1/4/2023  3:30 PM Ozzie Herzog, PT [facility]   1/6/2023  3:30 PM Ozzie Herzog, PT [facility]   1/9/2023  3:30 PM Ozzie Herzog, PT [facility]   1/18/2023 11:00 AM MD TAMIKA Magallon BS AMB

## 2023-01-06 ENCOUNTER — HOSPITAL ENCOUNTER (OUTPATIENT)
Dept: PHYSICAL THERAPY | Age: 68
Discharge: HOME | End: 2023-01-06
Payer: MEDICARE

## 2023-01-06 PROCEDURE — 97110 THERAPEUTIC EXERCISES: CPT

## 2023-01-06 PROCEDURE — 97140 MANUAL THERAPY 1/> REGIONS: CPT

## 2023-01-06 NOTE — PROGRESS NOTES
PHYSICAL THERAPY - DAILY TREATMENT NOTE    Patient Name: Jovana Coleman        Date: 2023  : 1955   yes Patient  Verified  Visit #:   5   of   8  Insurance: Payor: Evan Keller / Plan: [place] MEDICARE PART A & B / Product Type: Medicare /      In time: 135 Out time: 226   Total Treatment Time: 51     Medicare/BCBS Time Tracking (below)   Total Timed Codes (min):  41 1:1 Treatment Time:  41     TREATMENT AREA =  Other low back pain [M54.59]    SUBJECTIVE  Pain Level (on 0 to 10 scale):  1  / 10   Medication Changes/New allergies or changes in medical history, any new surgeries or procedures?    no  If yes, update Summary List   Subjective Functional Status/Changes:  []  No changes reported     Patient reports the IFC from the last session felt good. The symptoms do not persist as long as prior. Every once in a while it reminds him that it is still there. OBJECTIVE  Modalities Rationale:     decrease pain to improve patient's ability to perform ADLs with decreased pain and improved mobility.                          10 min [] Estim, type/location:                             Seated thoracic spine IFC                                     []  att     [x]  unatt     []  w/US     []  w/ice    [x]  w/heat     min []  Mechanical Traction: type/lbs                    []  pro   []  sup   []  int   []  cont    []  before manual    []  after manual     min []  Ultrasound, settings/location:        min []  Iontophoresis w/ dexamethasone, location:                                                []  take home patch       []  in clinic     min []  Ice     []  Heat    location/position:       min []  Vasopneumatic Device, press/temp:     If using vaso (only need to measure limb vaso being performed on)      pre-treatment girth :       post-treatment girth :       measured at (landmark location) :       min []  Other:     [] Skin assessment post-treatment (if applicable):    []  intact    []  redness- no adverse reaction                  []redness - adverse reaction:      11 min Therapeutic Exercise:  [x]  See flow sheet   Review of HEP   Rationale:      increase ROM, increase strength, improve coordination, improve balance, and increase proprioception to improve the patients ability to perform general ADLs with decrease c/o symptoms and with improved functional performance. 30 min Manual Therapy: L S/L DTM along R paraspinals, mid rib angle mobs, rib post/ant rot gr 2, CPA mid thoracic    Rationale:      decrease pain, increase ROM, increase tissue extensibility, decrease edema , and decrease trigger points to improve patient's ability to perform general ADLs with decrease c/o symptoms and with improved functional performance. The manual therapy interventions were performed at a separate and distinct time from the therapeutic activities interventions. Billed With/As:   [x] SARINA   [] MINERVA   [] Neuro   [] Self Care Patient Education: [x] Review HEP    [x] Progressed/Changed HEP based on: Access Code: [email or web address]  URL: [email or web address]  Date: 01/06/2023  Prepared by: Sarah Yin    Exercises  Sidelying Thoracic Rotation with Open Book - 1-2 x daily - 7 x weekly - 5-10 reps  Seated Trunk Rotation - Arms Crossed - 1-2 x daily - 7 x weekly - 2 reps - 15\" hold  Seated Diaphragmatic Breathing - 1 x daily - 7 x weekly - 10-15 reps - 3\" hold  Standing 'L' Stretch at Counter - 1-2 x daily - 7 x weekly - 5 reps - 5\" hold  Seated Scapular Retraction - 4 x daily - 7 x weekly - 10 reps - 3\" hold  Standard Plank - 1 x daily - 3 x weekly - 3 sets - 10-20\" hold  Side Plank on Knees - 1 x daily - 3 x weekly - 3 sets - 10-20\" hold  Bird Dog on [employer] [place] - 1 x daily - 3 x weekly - 3 sets - 10 reps  Standing Anti-Rotation Press with Anchored Resistance - 1 x daily - 3 x weekly - 3 sets - 10 reps      [] positioning   [] body mechanics   [] transfers   [] heat/ice application    [] other:      Other Objective/Functional Measures:    Slight more palpable at rib angle 7 near costovertebral joint with pain along rib angle      Post Treatment Pain Level (on 0 to 10) scale:   1  / 10     ASSESSMENT  Assessment/Changes in Function:     Patient tolerated today's treatment okay. No change with manual. Will refrain from heavy manual in the future. 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 Updated HEP with more core stability exercises due to possible underlying instability of rib angle 7. Pt should get MRI just in case to rule out pathology      []  See Progress Note/Recertification   Patient will continue to benefit from skilled PT services to modify and progress therapeutic interventions, address functional mobility deficits, address ROM deficits, address strength deficits, analyze and address soft tissue restrictions, analyze and cue movement patterns, analyze and modify body mechanics/ergonomics, assess and modify postural abnormalities, and instruct in home and community integration to attain remaining goals. Progress toward goals / Updated goals:    Patient is making slight progress towards their goals at this time. Better managed pain with use of IFC since last visit but no change in pain levels with manual. All goals remain applicable at this time.      PLAN  [x]  Upgrade activities as tolerated yes Continue plan of care   []  Discharge due to :    []  Other:      Therapist: Eriberto Wade PT    Date: 1/6/2023 Time: 9:26 AM     Future Appointments   Date Time Provider Ivis Guo   1/6/2023  2:00 PM Roseanna Duran PT [facility]   1/9/2023  3:30 PM Roseanna Duran PT [facility]   1/18/2023 11:00 AM Neo Crabtree MD [facility] BS AMB

## 2023-01-09 ENCOUNTER — HOSPITAL ENCOUNTER (OUTPATIENT)
Dept: PHYSICAL THERAPY | Age: 68
Discharge: HOME | End: 2023-01-09
Payer: MEDICARE

## 2023-01-09 PROCEDURE — 97014 ELECTRIC STIMULATION THERAPY: CPT

## 2023-01-09 PROCEDURE — 97140 MANUAL THERAPY 1/> REGIONS: CPT

## 2023-01-09 PROCEDURE — 97110 THERAPEUTIC EXERCISES: CPT

## 2023-01-09 NOTE — PROGRESS NOTES
PHYSICAL THERAPY - DAILY TREATMENT NOTE    Patient Name: Addison Becerra        Date: 2023  : 1955   yes Patient  Verified  Visit #:   6   of   8  Insurance: Payor: Sailaja Aranda / Plan: [place] MEDICARE PART A & B / Product Type: Medicare /      In time: 330 Out time: 413   Total Treatment Time: 43     Medicare/BCBS Time Tracking (below)   Total Timed Codes (min):  33 1:1 Treatment Time:  33     TREATMENT AREA =  Other low back pain [M54.59]    SUBJECTIVE  Pain Level (on 0 to 10 scale):  0 10   Medication Changes/New allergies or changes in medical history, any new surgeries or procedures?    no  If yes, update Summary List   Subjective Functional Status/Changes:  []  No changes reported     Patient reports the stim seems to be helping. Did wake up with the pain this morning. still sharp. Right now he has no pain and feels like he can do anything. OBJECTIVE  Modalities Rationale:     decrease pain to improve patient's ability to perform ADLs with decreased pain and improved mobility.                          10 min [] Estim, type/location:                             Seated thoracic spine IFC                                     []  att     [x]  unatt     []  w/US     []  w/ice    [x]  w/heat     min []  Mechanical Traction: type/lbs                    []  pro   []  sup   []  int   []  cont    []  before manual    []  after manual     min []  Ultrasound, settings/location:        min []  Iontophoresis w/ dexamethasone, location:                                                []  take home patch       []  in clinic     min []  Ice     []  Heat    location/position:       min []  Vasopneumatic Device, press/temp:     If using vaso (only need to measure limb vaso being performed on)      pre-treatment girth :       post-treatment girth :       measured at (landmark location) :       min []  Other:     [] Skin assessment post-treatment (if applicable):    []  intact    []  redness- no adverse reaction []redness - adverse reaction:      19 min Therapeutic Exercise:  [x]  See flow sheet   Rationale:      increase ROM, increase strength, improve coordination, improve balance, and increase proprioception to improve the patients ability to perform general ADLs with decrease c/o symptoms and with improved functional performance. 14 min Manual Therapy: L S/L DTM along R paraspinals, mid rib angle mobs, rib post/ant rot gr 2, CPA mid thoracic    Rationale:      decrease pain, increase ROM, increase tissue extensibility, decrease edema , and decrease trigger points to improve patient's ability to perform general ADLs with decrease c/o symptoms and with improved functional performance. The manual therapy interventions were performed at a separate and distinct time from the therapeutic activities interventions. Billed With/As:   [x] TE   [] TA   [] Neuro   [] Self Care Patient Education: [x] Review HEP    [] Progressed/Changed HEP based on:   [] positioning   [] body mechanics   [] transfers   [] heat/ice application    [] other:      Other Objective/Functional Measures:    Painful with B rot and coming back up from bending forward. Post Treatment Pain Level (on 0 to 10) scale:   1  / 10     ASSESSMENT  Assessment/Changes in Function:     Patient tolerated today's treatment very well. Only slight pain coming up up from S/L post manual. Focused more on unilateral strengthening. Tolerated today with minimal pain. []  See Progress Note/Recertification   Patient will continue to benefit from skilled PT services to modify and progress therapeutic interventions, address functional mobility deficits, address ROM deficits, address strength deficits, analyze and address soft tissue restrictions, analyze and cue movement patterns, analyze and modify body mechanics/ergonomics, assess and modify postural abnormalities, and instruct in home and community integration to attain remaining goals.    Progress toward goals / Updated goals:    Patient is making mild progress towards their goals at this time. Pain slowly improving with use of Estim and stabilization program. Still some pain with transitional motions. All goals remain applicable at this time.      PLAN  [x]  Upgrade activities as tolerated yes Continue plan of care   []  Discharge due to :    []  Other:      Therapist: Sim Horta, PT    Date: 1/9/2023 Time: 1:10 PM     Future Appointments   Date Time Provider Ivis Guo   1/16/2023  3:30 PM Anjum Swift,  [address] [facility]   1/18/2023 11:00 AM Helene Osgood, MD [facility] BS AMB

## 2023-01-16 ENCOUNTER — HOSPITAL ENCOUNTER (OUTPATIENT)
Dept: PHYSICAL THERAPY | Age: 68
Discharge: HOME | End: 2023-01-16
Payer: MEDICARE

## 2023-01-16 PROCEDURE — 97140 MANUAL THERAPY 1/> REGIONS: CPT

## 2023-01-16 PROCEDURE — 97110 THERAPEUTIC EXERCISES: CPT

## 2023-01-16 PROCEDURE — 97014 ELECTRIC STIMULATION THERAPY: CPT

## 2023-01-16 NOTE — THERAPY RECERTIFICATION
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 [address]      Cataract      Hypercholesterolemia      Hypertension      Ischemic cardiomyopathy         HX CORNEAL TRANSPLANT Right 2016    HX CORNEAL TRANSPLANT Left 2018    HX CORONARY STENT PLACEMENT   2015    HX HIP REPLACEMENT Bilateral       Medications: Verified on Patient Summary List   Visits from [facility]: 7 Missed Visits: 0     Goal/Measure of Progress Goal Met? 1. Pt will be IND with HEP to facilitate self care management. Status at last Eval: Goal established  Current Status: IND yes   2. Pt will improve T/S ROM to L/R rot >65% to order to improve ability to exercise without pain or restriction   Status at last Eval: Goal established  Current Status: L rot  80% P! R rot 80% P! Yes for improved mobility by but still symptomatic    3. Pt will improve worst pain levels from initial evaluation level of 6/10 to 3/10 to show improved QOL and improved overall perception of pain-free/more pain-free function with ADLs. Status at last Eval: Worst pain 6/10 Current Status: Worst pain 6/10 No change     Key Functional Changes/Progress:   Pt has made fair progress in PT as demonstrated by similar max pain levels at 6/10 but the pain becoming less and less frequent at those pain levels. Average pain is around 1-3/10.  Pt also notes improvement in symptoms with use of heat and IFC, which has seemed to help the most out of all interventions. She still has pain with lifting heavier things and continued tenderness in the R flank area. Limits him with certain positions like laying on the R side and laying prone. He can resume some weightlifting activities but is limited somewhat due to the R rib pain. Pt notes having been  performing the HEP as prescribed. Pt is scheduled for an MRI for 2/3/22 so he would like to do that and then figure out the next steps after that. Due to having made some progress with PT in terms of pain irritability and frequency, pt could continue to benefit from skilled PT services addressing his pain and functional mobility. OBJECTIVE  Thoracolumbar ROM   Flexion 90% p! R lat  Ext 75%  L SB 75% slight pain   R SB 75%   L rot  80% P! R rot 80% P! Shoulder AROM   Flexion no pain limited   ABD pain limited     Shoulder MMT  ADD 4/5 p! Ext 5/5 no p! Er 5/5 no p! IR 5/5 p! Palpation - TTP along Rib angle 5-7 medially extending laterally, R lat    FOTO -53 (56 at the start)    Problem List: pain affecting function, decrease ROM, decrease strength, edema affecting function, decrease ADL/ functional abilitiies, decrease activity tolerance, decrease flexibility/ joint mobility, and decrease transfer abilities   Treatment Plan may include any combination of the following: Therapeutic exercise, Neuromuscular reeducation, Manual therapy, Therapeutic activity, Self care/home management, Electric stim unattended , Needle insertion w/o injection (1 or 2 muscles), and Needle insertion w/o injection (3+ muscles)  Patient Goal(s) has been updated and includes:     Goals for this certification period include and are to be achieved in   4  weeks:  Goals 1 and 3 as above  Pt will improve FOTO scores to 68 in order to show detectable change in overall function.    Pt will be able to perform return to light weight lifting without sharp pain in T/S or referral into chest wall signifying improvement in overall functional capacity and activity tolerance. Frequency / Duration:   Patient to be seen   1   times per week for   4    weeks:    Assessments/Recommendations: Recommend pt follow up for MRI/. Pt could continue to benefit from treatment  addressing the remaining limitations/impairments which include thoracolumbar ROM, stability and decreased pain. If you have any questions/comments please contact us directly at [phone]. Thank you for allowing us to assist in the care of your patient. Therapist Signature: Jorge Luis Gonsalez PT Date: 6/32/0801   Certification Period:  Reporting Period: 12/16/22-3/16/23  12/16/22 - 01/16/23   Time: 10:42 AM   NOTE TO PHYSICIAN:  PLEASE COMPLETE THE ORDERS BELOW AND FAX TO   [facility]: [phone]  If you are unable to process this request in 24 hours please contact our office: [phone]    ___ I have read the above report and request that my patient continue as recommended.   ___ I have read the above report and request that my patient continue therapy with the following changes/special instructions: ________________________________________________   ___ I have read the above report and request that my patient be discharged from therapy.      Physician Signature:        Date:       Time:                                       Danis Koch MD

## 2023-01-16 NOTE — PROGRESS NOTES
PHYSICAL THERAPY - DAILY TREATMENT NOTE    Patient Name: aSbino Began        Date: 2023  : 1955   yes Patient  Verified  Visit #:   7   of   8  Insurance: Payor: Ulysses Luster / Plan: [facility] [place] PART A & B / Product Type: Medicare /      In time: 330 Out time: 410   Total Treatment Time: 40     Medicare/BCBS Time Tracking (below)   Total Timed Codes (min):  30 1:1 Treatment Time:  30     TREATMENT AREA =  Other low back pain [M54.59]    SUBJECTIVE  Pain Level (on 0 to 10 scale):  0  / 10   Medication Changes/New allergies or changes in medical history, any new surgeries or procedures?    no  If yes, update Summary List   Subjective Functional Status/Changes:  []  No changes reported     See PN         OBJECTIVE  Modalities Rationale:     decrease pain to improve patient's ability to perform ADLs with decreased pain and improved mobility.                          10 min [] Estim, type/location:                             Seated thoracic spine IFC                                     []  att     [x]  unatt     []  w/US     []  w/ice    [x]  w/heat     min []  Mechanical Traction: type/lbs                    []  pro   []  sup   []  int   []  cont    []  before manual    []  after manual     min []  Ultrasound, settings/location:        min []  Iontophoresis w/ dexamethasone, location:                                                []  take home patch       []  in clinic     min []  Ice     []  Heat    location/position:       min []  Vasopneumatic Device, press/temp:     If using vaso (only need to measure limb vaso being performed on)      pre-treatment girth :       post-treatment girth :       measured at (landmark location) :       min []  Other:     [] Skin assessment post-treatment (if applicable):    []  intact    []  redness- no adverse reaction                  []redness - adverse reaction:      10 min Therapeutic Exercise:  [x]  See flow sheet   Rationale:      increase ROM, increase strength, improve coordination, improve balance, and increase proprioception to improve the patients ability to perform general ADLs with decrease c/o symptoms and with improved functional performance. 10 min Manual Therapy: L S/L IASTM/DTM along R flank and lat   Rationale:      decrease pain, increase ROM, increase tissue extensibility, and decrease trigger points to improve patient's ability to perform general ADLs with decrease c/o symptoms and with improved functional performance. The manual therapy interventions were performed at a separate and distinct time from the therapeutic activities interventions. 10 min Self Care: Reassessment of goals and measurements taken  Discussion and review of HEP and POC       Rationale:    increase ROM, increase strength, improve coordination, improve balance, and increase proprioception to improve the patients ability to manage symptoms more IND for ease of ADLs    Billed With/As:   [x] TE   [] TA   [] Neuro   [x] Self Care Patient Education: [x] Review HEP    [] Progressed/Changed HEP based on:   [] positioning   [] body mechanics   [] transfers   [x] heat/ice application    [] other:      Other Objective/Functional Measures:    See PN     Post Treatment Pain Level (on 0 to 10) scale:   0  / 10     ASSESSMENT  Assessment/Changes in Function:     See PN     []  See Progress Note/Recertification   Patient will continue to benefit from skilled PT services to modify and progress therapeutic interventions, address functional mobility deficits, address ROM deficits, address strength deficits, analyze and address soft tissue restrictions, analyze and cue movement patterns, analyze and modify body mechanics/ergonomics, assess and modify postural abnormalities, and instruct in home and community integration to attain remaining goals.    Progress toward goals / Updated goals:    See PN     PLAN  [x]  Upgrade activities as tolerated yes Continue plan of care   []  Discharge due to :    [x]  Other: 1x per week for 4 weeks     Therapist: Jorge Luis Gonsalez PT    Date: 1/16/2023 Time: 10:43 AM     Future Appointments   Date Time Provider Ivis Guo   1/18/2023 11:00 AM Danis Koch MD [facility]

## 2023-02-09 ENCOUNTER — OFFICE VISIT (OUTPATIENT)
Dept: FAMILY MEDICINE CLINIC | Age: 68
End: 2023-02-09
Payer: MEDICARE

## 2023-02-09 VITALS
BODY MASS INDEX: 37.39 KG/M2 | HEIGHT: 65 IN | WEIGHT: 224.4 LBS | HEART RATE: 64 BPM | DIASTOLIC BLOOD PRESSURE: 84 MMHG | RESPIRATION RATE: 15 BRPM | OXYGEN SATURATION: 98 % | TEMPERATURE: 97.4 F | SYSTOLIC BLOOD PRESSURE: 128 MMHG

## 2023-02-09 DIAGNOSIS — E66.01 SEVERE OBESITY (BMI 35.0-39.9) WITH COMORBIDITY (HCC): ICD-10-CM

## 2023-02-09 DIAGNOSIS — M54.2 NECK PAIN: ICD-10-CM

## 2023-02-09 DIAGNOSIS — I10 ESSENTIAL HYPERTENSION: ICD-10-CM

## 2023-02-09 DIAGNOSIS — M50.20 PROTRUDED CERVICAL DISC: Primary | ICD-10-CM

## 2023-02-09 DIAGNOSIS — M54.9 UPPER BACK PAIN: ICD-10-CM

## 2023-02-09 PROCEDURE — 1101F PT FALLS ASSESS-DOCD LE1/YR: CPT | Performed by: LEGAL MEDICINE

## 2023-02-09 PROCEDURE — 3017F COLORECTAL CA SCREEN DOC REV: CPT | Performed by: LEGAL MEDICINE

## 2023-02-09 PROCEDURE — 1123F ACP DISCUSS/DSCN MKR DOCD: CPT | Performed by: LEGAL MEDICINE

## 2023-02-09 PROCEDURE — G8417 CALC BMI ABV UP PARAM F/U: HCPCS | Performed by: LEGAL MEDICINE

## 2023-02-09 PROCEDURE — 3074F SYST BP LT 130 MM HG: CPT | Performed by: LEGAL MEDICINE

## 2023-02-09 PROCEDURE — G8536 NO DOC ELDER MAL SCRN: HCPCS | Performed by: LEGAL MEDICINE

## 2023-02-09 PROCEDURE — 3079F DIAST BP 80-89 MM HG: CPT | Performed by: LEGAL MEDICINE

## 2023-02-09 PROCEDURE — G8510 SCR DEP NEG, NO PLAN REQD: HCPCS | Performed by: LEGAL MEDICINE

## 2023-02-09 PROCEDURE — 99213 OFFICE O/P EST LOW 20 MIN: CPT | Performed by: LEGAL MEDICINE

## 2023-02-09 PROCEDURE — G8427 DOCREV CUR MEDS BY ELIG CLIN: HCPCS | Performed by: LEGAL MEDICINE

## 2023-02-09 RX ORDER — CARVEDILOL 6.25 MG/1
6.25 TABLET ORAL 2 TIMES DAILY WITH MEALS
Qty: 180 TABLET | Refills: 1 | Status: SHIPPED | OUTPATIENT
Start: 2023-02-09

## 2023-02-09 RX ORDER — LISINOPRIL 10 MG/1
10 TABLET ORAL DAILY
Qty: 90 TABLET | Refills: 1 | Status: SHIPPED | OUTPATIENT
Start: 2023-02-09

## 2023-02-09 NOTE — PROGRESS NOTES
[facility]     Chief Complaint   Patient presents with    Results     Discuss MRI results     Vitals:    02/09/23 1043   BP: 128/84   Pulse: 64   Resp: 15   Temp: 97.4 °F (36.3 °C)   TempSrc: Temporal   SpO2: 98%   Weight: 224 lb 6.4 oz (101.8 kg)   Height: 5' 5\" (1.651 m)   PainSc:   0 - No pain         HPI:Kelvin Maharaj is here for follow up on MRI   Upper back pain is much better he takes tylenol 500 mg every other day , no numbness, he declined PT last time it did not help much   He     Impression    1. No significant degenerative disease in the thoracic spine. No focal disc herniation, central or foraminal stenosis in the thoracic spine   2. Right paracentral disc protrusion at C6-C7, slightly contacting spinal cord. MRI of the cervical spine may be indicated for further evaluation. 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 (HCC)     Cataract     Hypercholesterolemia     Hypertension     Ischemic cardiomyopathy      Past Surgical History:   Procedure Laterality Date    HX CORNEAL TRANSPLANT Right 2016    HX CORNEAL TRANSPLANT Left 2018    HX CORONARY STENT PLACEMENT  03/2015    HX HIP REPLACEMENT Bilateral 2011     Social History     Tobacco Use    Smoking status: Some Days     Types: Cigars    Smokeless tobacco: Never   Substance Use Topics    Alcohol use: Not Currently       Family History   Problem Relation Age of Onset    Heart Disease Father        Review of Systems   Constitutional:  Negative for chills, fever, malaise/fatigue and weight loss. HENT:  Negative for congestion, ear discharge, ear pain, hearing loss and nosebleeds. Eyes:  Negative for blurred vision, double vision and discharge. Respiratory:  Negative for cough. Cardiovascular:  Negative for chest pain, palpitations, claudication and leg swelling. Gastrointestinal:  Negative for abdominal pain, constipation, diarrhea, nausea and vomiting. Genitourinary:  Negative for dysuria, frequency and urgency. Musculoskeletal:  Positive for back pain, myalgias and neck pain. Skin:  Negative for itching and rash. Neurological:  Negative for dizziness, tingling, sensory change, speech change, focal weakness, weakness and headaches. Physical Exam  Vitals and nursing note reviewed. Constitutional:       General: He is not in acute distress. Appearance: Normal appearance. He is well-developed. He is not ill-appearing, toxic-appearing or diaphoretic. HENT:      Head: Normocephalic and atraumatic. Eyes:      General: No scleral icterus. Neck:      Thyroid: No thyromegaly. Cardiovascular:      Rate and Rhythm: Normal rate and regular rhythm. Heart sounds: Normal heart sounds. Pulmonary:      Effort: Pulmonary effort is normal. No respiratory distress. Breath sounds: Normal breath sounds. No rales. Abdominal:      General: Bowel sounds are normal. There is no distension. Palpations: Abdomen is soft. There is no mass. Tenderness: There is no abdominal tenderness. There is no rebound. Musculoskeletal:         General: No tenderness or deformity. Normal range of motion. Cervical back: Normal range of motion and neck supple. No rigidity or tenderness. Right lower leg: No edema. Left lower leg: No edema. Lymphadenopathy:      Cervical: No cervical adenopathy. Skin:     General: Skin is warm and dry. Findings: No erythema or rash. Neurological:      General: No focal deficit present. Mental Status: He is alert and oriented to person, place, and time. Cranial Nerves: No cranial nerve deficit. Coordination: Coordination normal.      Gait: Gait normal.   Psychiatric:         Mood and Affect: Mood normal.         Behavior: Behavior normal.         Thought Content:  Thought content normal.         Judgment: Judgment normal.        Assessment and plan     Plan of care has been discussed with the patient, he agrees to the plan and verbalized understanding. All his questions were answered  More than 50% of the time spent in this visit was counseling the patient about  illness and treatment options         1. Protruded cervical disc  MRI showed protruded disc C6-C7 and recommendation is for cervical MRI to be done  - MRI CERV SPINE WO CONT; Future    2. Neck pain  Get an MRI and he may need a possible referral to neurosurgery based on the results  - MRI CERV SPINE WO CONT; Future    3. Upper back pain  Advised patient to avoid any heavy lifting  - MRI CERV SPINE WO CONT; Future    4. Severe obesity (BMI 35.0-39. 9) with comorbidity [facility])  Patient has gained weight because he cannot exercise I advised him that he can do cardiovascular exercises    5. Essential hypertension  Blood pressure is well controlled   - lisinopriL (PRINIVIL, ZESTRIL) 10 mg tablet; Take 1 Tablet by mouth daily. Dispense: 90 Tablet; Refill: 1  - carvediloL (COREG) 6.25 mg tablet; Take 1 Tablet by mouth two (2) times daily (with meals). Dispense: 180 Tablet; Refill: 1  Current Outpatient Medications   Medication Sig Dispense Refill    lisinopriL (PRINIVIL, ZESTRIL) 10 mg tablet Take 1 Tablet by mouth daily. 90 Tablet 1    carvediloL (COREG) 6.25 mg tablet Take 1 Tablet by mouth two (2) times daily (with meals). 180 Tablet 1    prednisoLONE acetate (PRED FORTE) 1 % ophthalmic suspension       atorvastatin (LIPITOR) 80 mg tablet Take 1 Tablet by mouth daily. 90 Tablet 3    spironolactone (ALDACTONE) 25 mg tablet Take 1 Tablet by mouth daily. 90 Tablet 3    furosemide (LASIX) 40 mg tablet Take 1 Tablet by mouth daily. 90 Tablet 3    aspirin delayed-release 81 mg tablet Take 1 Tablet by mouth daily. 90 Tablet 3    brimonidine-timoloL (COMBIGAN) 0.2-0.5 % drop ophthalmic solution Apply 1 Drop to eye daily. methocarbamoL (ROBAXIN) 500 mg tablet Take 500 mg by mouth four (4) times daily.  (Patient not taking: Reported on 2/9/2023) methocarbamoL (ROBAXIN) 500 mg tablet Take 1 Tablet by mouth three (3) times daily as needed for Muscle Spasm(s) for up to 10 days. (Patient not taking: Reported on 2/9/2023) 40 Tablet 0       Patient Active Problem List    Diagnosis Date Noted    Coronary artery disease involving native coronary artery of native heart without angina pectoris 02/27/2019    Ischemic cardiomyopathy 02/27/2019     Results for orders placed or performed during the hospital encounter of 22/67/88   METABOLIC PANEL, COMPREHENSIVE   Result Value Ref Range    Sodium 139 136 - 145 mmol/L    Potassium 4.3 3.5 - 5.5 mmol/L    Chloride 108 100 - 111 mmol/L    CO2 25 21 - 32 mmol/L    Anion gap 6 3.0 - 18 mmol/L    Glucose 87 74 - 99 mg/dL    BUN 29 (H) 7.0 - 18 MG/DL    Creatinine 1.06 0.6 - 1.3 MG/DL    BUN/Creatinine ratio 27 (H) 12 - 20      GFR est AA >60 >60 ml/min/1.73m2    GFR est non-AA >60 >60 ml/min/1.73m2    Calcium 8.7 8.5 - 10.1 MG/DL    Bilirubin, total 0.7 0.2 - 1.0 MG/DL    ALT (SGPT) 38 16 - 61 U/L    AST (SGOT) 26 10 - 38 U/L    Alk.  phosphatase 97 45 - 117 U/L    Protein, total 6.6 6.4 - 8.2 g/dL    Albumin 3.7 3.4 - 5.0 g/dL    Globulin 2.9 2.0 - 4.0 g/dL    A-G Ratio 1.3 0.8 - 1.7     LIPID PANEL   Result Value Ref Range    LIPID PROFILE          Cholesterol, total 138 <200 MG/DL    Triglyceride 133 <150 MG/DL    HDL Cholesterol 42 40 - 60 MG/DL    LDL, calculated 69.4 0 - 100 MG/DL    VLDL, calculated 26.6 MG/DL    CHOL/HDL Ratio 3.3 0 - 5.0     CBC WITH AUTOMATED DIFF   Result Value Ref Range    WBC 8.4 4.6 - 13.2 K/uL    RBC 5.01 4.35 - 5.65 M/uL    HGB 15.9 13.0 - 16.0 g/dL    HCT 47.7 36.0 - 48.0 %    MCV 95.2 78.0 - 100.0 FL    MCH 31.7 24.0 - 34.0 PG    MCHC 33.3 31.0 - 37.0 g/dL    RDW 13.2 11.6 - 14.5 %    PLATELET 500 530 - 964 K/uL    MPV 9.3 9.2 - 11.8 FL    NRBC 0.0 0  WBC    ABSOLUTE NRBC 0.00 0.00 - 0.01 K/uL    NEUTROPHILS 71 40 - 73 %    LYMPHOCYTES 14 (L) 21 - 52 %    MONOCYTES 11 (H) 3 - 10 % EOSINOPHILS 3 0 - 5 %    BASOPHILS 1 0 - 2 %    IMMATURE GRANULOCYTES 0 0.0 - 0.5 %    ABS. NEUTROPHILS 6.0 1.8 - 8.0 K/UL    ABS. LYMPHOCYTES 1.1 0.9 - 3.6 K/UL    ABS. MONOCYTES 0.9 0.05 - 1.2 K/UL    ABS. EOSINOPHILS 0.3 0.0 - 0.4 K/UL    ABS. BASOPHILS 0.1 0.0 - 0.1 K/UL    ABS. IMM. GRANS. 0.0 0.00 - 0.04 K/UL    DF AUTOMATED     HEMOGLOBIN A1C W/O EAG   Result Value Ref Range    Hemoglobin A1c 5.6 4.2 - 5.6 %     No visits with results within 3 Month(s) from this visit. Latest known visit with results is:   Hospital Outpatient Visit on 07/19/2022   Component Date Value Ref Range Status    Sodium 07/19/2022 139  136 - 145 mmol/L Final    Potassium 07/19/2022 4.3  3.5 - 5.5 mmol/L Final    Chloride 07/19/2022 108  100 - 111 mmol/L Final    CO2 07/19/2022 25  21 - 32 mmol/L Final    Anion gap 07/19/2022 6  3.0 - 18 mmol/L Final    Glucose 07/19/2022 87  74 - 99 mg/dL Final    BUN 07/19/2022 29 (A)  7.0 - 18 MG/DL Final    Creatinine 07/19/2022 1.06  0.6 - 1.3 MG/DL Final    BUN/Creatinine ratio 07/19/2022 27 (A)  12 - 20   Final    GFR est AA 07/19/2022 >60  >60 ml/min/1.73m2 Final    GFR est non-AA 07/19/2022 >60  >60 ml/min/1.73m2 Final    Comment: (NOTE)  Estimated GFR is calculated using the Modification of Diet in Renal   Disease (MDRD) Study equation, reported for both  Americans   (GFRAA) and non- Americans (GFRNA), and normalized to 1.73m2   body surface area. The physician must decide which value applies to   the patient. The MDRD study equation should only be used in   individuals age 25 or older. It has not been validated for the   following: pregnant women, patients with serious comorbid conditions,   or on certain medications, or persons with extremes of body size,   muscle mass, or nutritional status.       Calcium 07/19/2022 8.7  8.5 - 10.1 MG/DL Final    Bilirubin, total 07/19/2022 0.7  0.2 - 1.0 MG/DL Final    ALT (SGPT) 07/19/2022 38  16 - 61 U/L Final    AST (SGOT) 07/19/2022 26 10 - 38 U/L Final    Alk. phosphatase 07/19/2022 97  45 - 117 U/L Final    Protein, total 07/19/2022 6.6  6.4 - 8.2 g/dL Final    Albumin 07/19/2022 3.7  3.4 - 5.0 g/dL Final    Globulin 07/19/2022 2.9  2.0 - 4.0 g/dL Final    A-G Ratio 07/19/2022 1.3  0.8 - 1.7   Final    LIPID PROFILE 07/19/2022        Final    Cholesterol, total 07/19/2022 138  <200 MG/DL Final    Triglyceride 07/19/2022 133  <150 MG/DL Final    Comment: The drugs N-acetylcysteine (NAC) and  Metamiszole have been found to cause falsely  low results in this chemical assay. Please  be sure to submit blood samples obtained  BEFORE administration of either of these  drugs to assure correct results. HDL Cholesterol 07/19/2022 42  40 - 60 MG/DL Final    LDL, calculated 07/19/2022 69.4  0 - 100 MG/DL Final    VLDL, calculated 07/19/2022 26.6  MG/DL Final    CHOL/HDL Ratio 07/19/2022 3.3  0 - 5.0   Final    WBC 07/19/2022 8.4  4.6 - 13.2 K/uL Final    RBC 07/19/2022 5.01  4.35 - 5.65 M/uL Final    HGB 07/19/2022 15.9  13.0 - 16.0 g/dL Final    HCT 07/19/2022 47.7  36.0 - 48.0 % Final    MCV 07/19/2022 95.2  78.0 - 100.0 FL Final    MCH 07/19/2022 31.7  24.0 - 34.0 PG Final    MCHC 07/19/2022 33.3  31.0 - 37.0 g/dL Final    RDW 07/19/2022 13.2  11.6 - 14.5 % Final    PLATELET 43/20/6694 905  135 - 420 K/uL Final    MPV 07/19/2022 9.3  9.2 - 11.8 FL Final    NRBC 07/19/2022 0.0  0  WBC Final    ABSOLUTE NRBC 07/19/2022 0.00  0.00 - 0.01 K/uL Final    NEUTROPHILS 07/19/2022 71  40 - 73 % Final    LYMPHOCYTES 07/19/2022 14 (A)  21 - 52 % Final    MONOCYTES 07/19/2022 11 (A)  3 - 10 % Final    EOSINOPHILS 07/19/2022 3  0 - 5 % Final    BASOPHILS 07/19/2022 1  0 - 2 % Final    IMMATURE GRANULOCYTES 07/19/2022 0  0.0 - 0.5 % Final    ABS. NEUTROPHILS 07/19/2022 6.0  1.8 - 8.0 K/UL Final    ABS. LYMPHOCYTES 07/19/2022 1.1  0.9 - 3.6 K/UL Final    ABS. MONOCYTES 07/19/2022 0.9  0.05 - 1.2 K/UL Final    ABS.  EOSINOPHILS 07/19/2022 0.3  0.0 - 0.4 K/UL Final    ABS. BASOPHILS 07/19/2022 0.1  0.0 - 0.1 K/UL Final    ABS. IMM.  GRANS. 07/19/2022 0.0  0.00 - 0.04 K/UL Final    DF 07/19/2022 AUTOMATED    Final    Hemoglobin A1c 07/19/2022 5.6  4.2 - 5.6 % Final    Comment: (NOTE)  HbA1C Interpretive Ranges  <5.7              Normal  5.7 - 6.4         Consider Prediabetes  >6.5              Consider Diabetes

## 2023-02-09 NOTE — PROGRESS NOTES
Gildardo Camacho is a 75 y.o. male (: 1955) presenting to address:    Chief Complaint   Patient presents with    Results     Discuss MRI results       Vitals:    23 1043   BP: 128/84   Pulse: 64   Resp: 15   Temp: 97.4 °F (36.3 °C)   TempSrc: Temporal   SpO2: 98%   Weight: 224 lb 6.4 oz (101.8 kg)   Height: 5' 5\" (1.651 m)   PainSc:   0 - No pain       Hearing/Vision:   No results found. Learning Assessment:     Learning Assessment 2019   PRIMARY LEARNER Patient   HIGHEST LEVEL OF EDUCATION - PRIMARY LEARNER  2 YEARS OF COLLEGE   BARRIERS PRIMARY LEARNER NONE   CO-LEARNER CAREGIVER No   PRIMARY LANGUAGE ENGLISH    NEED No   LEARNER PREFERENCE PRIMARY READING   LEARNING SPECIAL TOPICS none   ANSWERED BY patient   RELATIONSHIP SELF   ASSESSMENT COMMENT n/a     Depression Screening:     3 most recent PHQ Screens 2023   PHQ Not Done -   Little interest or pleasure in doing things Not at all   Feeling down, depressed, irritable, or hopeless Not at all   Total Score PHQ 2 0     Fall Risk Assessment:     Fall Risk Assessment, last 12 mths 2023   Able to walk? Yes   Fall in past 12 months? 0   Do you feel unsteady? 0   Are you worried about falling 0   Is the gait abnormal? -   Number of falls in past 12 months -   Fall with injury? -     Abuse Screening:     Abuse Screening Questionnaire 2022   Do you ever feel afraid of your partner? N   Are you in a relationship with someone who physically or mentally threatens you? N   Is it safe for you to go home?  Y     ADL Assessment:     ADL Assessment 2022   Feeding yourself No Help Needed   Getting from bed to chair No Help Needed   Getting dressed No Help Needed   Bathing or showering No Help Needed   Walk across the room (includes cane/walker) No Help Needed   Using the telphone No Help Needed   Taking your medications No Help Needed   Preparing meals No Help Needed   Managing money (expenses/bills) No Help Needed   Moderately strenuous housework (laundry) No Help Needed   Shopping for personal items (toiletries/medicines) No Help Needed   Shopping for groceries No Help Needed   Driving No Help Needed   Climbing a flight of stairs No Help Needed   Getting to places beyond walking distances No Help Needed        Coordination of Care Questionaire:   1. \"Have you been to the ER, urgent care clinic since your last visit? Hospitalized since your last visit? \" No    2. \"Have you seen or consulted any other health care providers outside of the [address] [place] [address] [place] since your last visit? \"  MRI      3. For patients aged 39-70: Has the patient had a colonoscopy / FIT/ Cologuard? Yes - no Care Gap present    If the patient is female:    4. For patients aged 41-77: Has the patient had a mammogram within the past 2 years? NA - based on age or sex  See top three    5. For patients aged 21-65: Has the patient had a pap smear? NA - based on age or sex    Advanced Directive:   1. Do you have an Advanced Directive? YES    2. Would you like information on Advanced Directives?  NO

## 2023-03-08 ENCOUNTER — TELEPHONE (OUTPATIENT)
Dept: FAMILY MEDICINE CLINIC | Facility: CLINIC | Age: 68
End: 2023-03-08

## 2023-03-09 NOTE — TELEPHONE ENCOUNTER
X ray of spine showed  multilevel spondylosis     Cervical spondylosis is a general term for age-related wear and tear affecting the spinal disks in your neck. As the disks dehydrate and shrink, signs of osteoarthritis develop, including bony projections along the edges of bones (bone spurs). Cervical spondylosis is very common and worsens with age.   He does not need surgery   Does he want to be referred for pain management

## 2023-05-12 DIAGNOSIS — M54.2 NECK PAIN: ICD-10-CM

## 2023-05-12 DIAGNOSIS — M50.20 PROTRUDED CERVICAL DISC: ICD-10-CM

## 2023-05-12 DIAGNOSIS — M54.9 UPPER BACK PAIN: ICD-10-CM

## 2023-06-05 ENCOUNTER — TELEPHONE (OUTPATIENT)
Dept: FAMILY MEDICINE CLINIC | Facility: CLINIC | Age: 68
End: 2023-06-05

## 2023-06-05 NOTE — TELEPHONE ENCOUNTER
Pt called to request an appt for flu like sx; pt has not been tested for COVID; pt states will get test and call back w/this results, if negative, pt will schedule w/another provider for eval and tx.

## 2023-06-06 ENCOUNTER — OFFICE VISIT (OUTPATIENT)
Dept: FAMILY MEDICINE CLINIC | Facility: CLINIC | Age: 68
End: 2023-06-06
Payer: MEDICARE

## 2023-06-06 VITALS
RESPIRATION RATE: 17 BRPM | BODY MASS INDEX: 36.55 KG/M2 | SYSTOLIC BLOOD PRESSURE: 110 MMHG | OXYGEN SATURATION: 96 % | HEIGHT: 65 IN | TEMPERATURE: 97 F | DIASTOLIC BLOOD PRESSURE: 70 MMHG | WEIGHT: 219.4 LBS | HEART RATE: 57 BPM

## 2023-06-06 DIAGNOSIS — B34.9 VIRAL ILLNESS: Primary | ICD-10-CM

## 2023-06-06 LAB
INFLUENZA A ANTIGEN, POC: NEGATIVE
INFLUENZA B ANTIGEN, POC: NEGATIVE
VALID INTERNAL CONTROL, POC: YES

## 2023-06-06 PROCEDURE — 1123F ACP DISCUSS/DSCN MKR DOCD: CPT | Performed by: INTERNAL MEDICINE

## 2023-06-06 PROCEDURE — 87804 INFLUENZA ASSAY W/OPTIC: CPT | Performed by: INTERNAL MEDICINE

## 2023-06-06 PROCEDURE — 99213 OFFICE O/P EST LOW 20 MIN: CPT | Performed by: INTERNAL MEDICINE

## 2023-06-06 RX ORDER — PREDNISONE 10 MG/1
TABLET ORAL
Qty: 1 EACH | Refills: 0 | Status: SHIPPED | OUTPATIENT
Start: 2023-06-06

## 2023-06-06 RX ORDER — ALBUTEROL SULFATE 90 UG/1
2 AEROSOL, METERED RESPIRATORY (INHALATION) 4 TIMES DAILY PRN
Qty: 1 EACH | Refills: 1 | Status: SHIPPED | OUTPATIENT
Start: 2023-06-06

## 2023-06-06 RX ORDER — BENZONATATE 200 MG/1
200 CAPSULE ORAL 3 TIMES DAILY PRN
Qty: 20 CAPSULE | Refills: 0 | Status: SHIPPED | OUTPATIENT
Start: 2023-06-06

## 2023-06-06 SDOH — ECONOMIC STABILITY: FOOD INSECURITY: WITHIN THE PAST 12 MONTHS, YOU WORRIED THAT YOUR FOOD WOULD RUN OUT BEFORE YOU GOT MONEY TO BUY MORE.: NEVER TRUE

## 2023-06-06 SDOH — ECONOMIC STABILITY: INCOME INSECURITY: HOW HARD IS IT FOR YOU TO PAY FOR THE VERY BASICS LIKE FOOD, HOUSING, MEDICAL CARE, AND HEATING?: NOT VERY HARD

## 2023-06-06 SDOH — ECONOMIC STABILITY: HOUSING INSECURITY
IN THE LAST 12 MONTHS, WAS THERE A TIME WHEN YOU DID NOT HAVE A STEADY PLACE TO SLEEP OR SLEPT IN A SHELTER (INCLUDING NOW)?: NO

## 2023-06-06 SDOH — ECONOMIC STABILITY: FOOD INSECURITY: WITHIN THE PAST 12 MONTHS, THE FOOD YOU BOUGHT JUST DIDN'T LAST AND YOU DIDN'T HAVE MONEY TO GET MORE.: NEVER TRUE

## 2023-06-06 ASSESSMENT — PATIENT HEALTH QUESTIONNAIRE - PHQ9
1. LITTLE INTEREST OR PLEASURE IN DOING THINGS: 0
SUM OF ALL RESPONSES TO PHQ9 QUESTIONS 1 & 2: 0
SUM OF ALL RESPONSES TO PHQ QUESTIONS 1-9: 0
2. FEELING DOWN, DEPRESSED OR HOPELESS: 0
SUM OF ALL RESPONSES TO PHQ QUESTIONS 1-9: 0

## 2023-06-06 ASSESSMENT — ENCOUNTER SYMPTOMS
ABDOMINAL PAIN: 0
SORE THROAT: 0
STRIDOR: 0

## 2023-06-06 NOTE — PROGRESS NOTES
Dorothy Coley is a 75 y.o. male (: 1955) presenting to address:    Chief Complaint   Patient presents with    Nasal Congestion       There were no vitals filed for this visit. Coordination of Care Questionaire:   1. \"Have you been to the ER, urgent care clinic since your last visit? Hospitalized since your last visit? \" No    2. \"Have you seen or consulted any other health care providers outside of the [address] [place] [address] since your last visit? \" No     3. For patients aged 39-70: Has the patient had a colonoscopy / FIT/ Cologuard? Yes - no Care Gap present      If the patient is female:    4. For patients aged 41-77: Has the patient had a mammogram within the past 2 years? NA - based on age or sex      11. For patients aged 21-65: Has the patient had a pap smear? NA - based on age or sex    Advanced Directive:   1. Do you have an Advanced Directive? No    2. Would you like information on Advanced Directives?  No
Influenza A Antigen, POC Negative Negative    Influenza B Antigen, POC Negative Negative         Return if symptoms worsen or fail to improve.

## 2023-06-06 NOTE — PATIENT INSTRUCTIONS
You may take Tylenol, 500-1000 mg every 8 hours as needed for fever or pain.   You may take coricidin HBP as needed for congestion

## 2023-09-13 ENCOUNTER — TELEPHONE (OUTPATIENT)
Dept: FAMILY MEDICINE CLINIC | Facility: CLINIC | Age: 68
End: 2023-09-13